# Patient Record
Sex: FEMALE | Race: WHITE | NOT HISPANIC OR LATINO | Employment: FULL TIME | URBAN - METROPOLITAN AREA
[De-identification: names, ages, dates, MRNs, and addresses within clinical notes are randomized per-mention and may not be internally consistent; named-entity substitution may affect disease eponyms.]

---

## 2017-01-03 ENCOUNTER — HOSPITAL ENCOUNTER (OUTPATIENT)
Dept: MAMMOGRAPHY | Facility: CLINIC | Age: 52
Discharge: HOME | End: 2017-01-03
Payer: COMMERCIAL

## 2017-01-03 DIAGNOSIS — C50.911 MALIGNANT NEOPLASM OF RIGHT FEMALE BREAST (HCC): ICD-10-CM

## 2017-01-03 PROCEDURE — G0204 DX MAMMO INCL CAD BI: HCPCS

## 2017-01-17 ENCOUNTER — ALLSCRIPTS OFFICE VISIT (OUTPATIENT)
Dept: OTHER | Facility: OTHER | Age: 52
End: 2017-01-17

## 2017-03-07 ENCOUNTER — GENERIC CONVERSION - ENCOUNTER (OUTPATIENT)
Dept: OTHER | Facility: OTHER | Age: 52
End: 2017-03-07

## 2017-03-07 ENCOUNTER — APPOINTMENT (OUTPATIENT)
Dept: RADIATION ONCOLOGY | Facility: HOSPITAL | Age: 52
End: 2017-03-07
Attending: RADIOLOGY
Payer: COMMERCIAL

## 2017-03-07 PROCEDURE — 99214 OFFICE O/P EST MOD 30 MIN: CPT | Performed by: RADIOLOGY

## 2017-03-13 ENCOUNTER — HOSPITAL ENCOUNTER (EMERGENCY)
Facility: HOSPITAL | Age: 52
Discharge: HOME | End: 2017-03-13
Attending: EMERGENCY MEDICINE
Payer: COMMERCIAL

## 2017-03-13 VITALS
HEART RATE: 69 BPM | OXYGEN SATURATION: 100 % | RESPIRATION RATE: 16 BRPM | BODY MASS INDEX: 30.57 KG/M2 | DIASTOLIC BLOOD PRESSURE: 73 MMHG | SYSTOLIC BLOOD PRESSURE: 132 MMHG | TEMPERATURE: 98.4 F | WEIGHT: 156.53 LBS

## 2017-03-13 DIAGNOSIS — S61.219A LACERATION OF FINGER OF RIGHT HAND, INITIAL ENCOUNTER: Primary | ICD-10-CM

## 2017-03-13 PROCEDURE — 90715 TDAP VACCINE 7 YRS/> IM: CPT | Performed by: PHYSICIAN ASSISTANT

## 2017-03-13 PROCEDURE — 90471 IMMUNIZATION ADMIN: CPT

## 2017-03-13 PROCEDURE — 99282 EMERGENCY DEPT VISIT SF MDM: CPT

## 2017-03-13 RX ORDER — DEXLANSOPRAZOLE 60 MG/1
60 CAPSULE, DELAYED RELEASE ORAL EVERY EVENING
COMMUNITY

## 2017-03-13 RX ORDER — LEVOTHYROXINE SODIUM 0.1 MG/1
88 TABLET ORAL DAILY
COMMUNITY
End: 2021-03-10 | Stop reason: SDUPTHER

## 2017-03-13 RX ADMIN — TETANUS TOXOID, REDUCED DIPHTHERIA TOXOID AND ACELLULAR PERTUSSIS VACCINE, ADSORBED 0.5 ML: 5; 2.5; 8; 8; 2.5 SUSPENSION INTRAMUSCULAR at 20:32

## 2017-04-25 ENCOUNTER — ALLSCRIPTS OFFICE VISIT (OUTPATIENT)
Dept: OTHER | Facility: OTHER | Age: 52
End: 2017-04-25

## 2017-07-11 ENCOUNTER — TRANSCRIBE ORDERS (OUTPATIENT)
Dept: ADMINISTRATIVE | Facility: HOSPITAL | Age: 52
End: 2017-07-11

## 2017-07-11 ENCOUNTER — ALLSCRIPTS OFFICE VISIT (OUTPATIENT)
Dept: OTHER | Facility: OTHER | Age: 52
End: 2017-07-11

## 2017-07-11 DIAGNOSIS — Z85.3 PERSONAL HISTORY OF MALIGNANT NEOPLASM OF BREAST: Primary | ICD-10-CM

## 2017-10-03 ENCOUNTER — GENERIC CONVERSION - ENCOUNTER (OUTPATIENT)
Dept: OTHER | Facility: OTHER | Age: 52
End: 2017-10-03

## 2017-10-03 ENCOUNTER — APPOINTMENT (OUTPATIENT)
Dept: RADIATION ONCOLOGY | Facility: HOSPITAL | Age: 52
End: 2017-10-03
Attending: RADIOLOGY
Payer: COMMERCIAL

## 2017-10-03 PROCEDURE — 99214 OFFICE O/P EST MOD 30 MIN: CPT | Performed by: RADIOLOGY

## 2017-10-24 ENCOUNTER — ALLSCRIPTS OFFICE VISIT (OUTPATIENT)
Dept: OTHER | Facility: OTHER | Age: 52
End: 2017-10-24

## 2017-10-25 NOTE — PROGRESS NOTES
Assessment  1  Breast cancer (174 9) (C50 289)    Plan  Breast cancer    · Follow-up visit in 6 months Evaluation and Treatment  Follow-up  Status: Hold For -  Scheduling  Requested for: [id number]   Ordered; For: Breast cancer; Ordered By: Elmer Peng Performed:  Due: 09MTQ3847    Discussion/Summary  Discussion Summary:   A 46year old postmenopausal woman with stage IIB right breast cancer, grade 3, triple-negative disease  She is negative for BRCA gene mutation  She underwent lumpectomy with axillary lymph node dissection resulting in MILTON  She had 3 positive lymph nodes  She completed adjuvant chemotherapy with dose dense AC , followed by dose dense paclitaxel  She is currently on observation  She has no evidence of recurrent disease, based on her symptomatology and physical examination  I recommended her to continue with surveillance  I will see her again in 6 months for routine follow-up  Regarding vaginal dryness, she is going to see gynecologist  She has triple-negative breast cancer  Therefore, there is no strong contraindication to use estrogen-containing cream, if gynecologist think this is her choice of treatment  Chief Complaint  Chief Complaint: Chief Complaint:   The patient presents to the office today with 1  Right breast cancer, stage IIB (pT2, pN1a, M0)  Grade 3, ER/GA negative, HER-2 negative disease  Diagnosed in December 2015  BRCA gene mutation negative  Chief Complaint Free Text Note Form: Right breast cancer, stage IIB (pT2, pN1a, M0)   Grade 3, ER/GA negative, HER-2 negative disease  Diagnosed in December 2015  History of Present Illness  HPI: A 48year old postmenopausal woman who noticed a lump in the right breast, which she brought to medical attention in early December 2015   She was found to have abnormality, based on imaging study for which she underwent ultrasound-guided biopsy not only from right breast mass, as well as right axillary lymph node, both of which showed invasive ductal carcinoma  She was seen by Dr Ruby Tafoya who did lumpectomy with axillary lymph node dissection in December 31, 2015  She had 2 2x2  2x1 9 cm of invasive ductal carcinoma, grade 3  There was lymphovascular invasion  3/36 axillary lymph nodes positive for metastatic disease  This was ER/WV negative, HER-2 2+ disease  HER-2 fish was negative for gene amplification  She presents today to discuss adjuvant treatment options  She is scheduled to have port placement in February 4, 2016 by Dr Ruby Tafoya  She has no breast related symptomatology  She has no complaint of pain  She denied respiratory symptoms  Her weight is stable  Prior to the surgery, she underwent CT scan of chest and pelvis which showed no evidence of metastatic disease  She had 2 4 mm of pulmonary nodule in the right lower lobe  She was seen by genetic counselor  Her BRCA testing is pending at this time  She is otherwise healthy  She has hypothyroidism as well as hiatal hernia  He only takes 2 medications for each  She does not have a strong family history of breast cancer, at least in her first-degree latitude to however, maternal great aunt had breast cancer  She has 3 older sisters none of whom has history of breast or ovarian cancer  Her performance status is normal    Previous Therapy:   1  Adjuvant chemotherapy with dose dense AC Ã4 followed by dose dense paclitaxel Ã4  Completed in May 2016  Current Therapy: Observation  Disease Status: MILTON status post lumpectomy with axillary lymph node dissection  Interval History: A 46year old postmenopausal woman with stage II B right breast cancer, grade 3, triple-negative disease  She underwent lumpectomy with axillary lymph node dissection, resulting in MILTON  She had 3 positive axillary lymph nodes  She is negative for BRCA gene mutation  She completed adjuvant chemotherapy with dose dense AC , followed by paclitaxel  In May 2016   Subsequently, she had adjuvant radiation therapy with no skin toxicity  She presented today for follow-up  She has no new complaints  Since 6 months ago, she has nearly 30 pound intentional weight loss  She feels well  She denied any pain  She has no complaint of neuropathy  He has no respiratory symptoms, such as cough, sputum production or shortness of breath  Her performance status is normal       Review of Systems  Complete-Female:   Constitutional: No fever, no chills, feels well, no tiredness, no recent weight gain or weight loss  Eyes: No complaints of eye pain, no red eyes, no eyesight problems, no discharge, no dry eyes, no itching of eyes  ENT: no complaints of earache, no loss of hearing, no nose bleeds, no nasal discharge, no sore throat, no hoarseness  Cardiovascular: No complaints of slow heart rate, no fast heart rate, no chest pain, no palpitations, no leg claudication, no lower extremity edema  Respiratory: No complaints of shortness of breath, no wheezing, no cough, no SOB on exertion, no orthopnea, no PND  Gastrointestinal: No complaints of abdominal pain, no constipation, no nausea or vomiting, no diarrhea, no bloody stools  Genitourinary: No complaints of dysuria, no incontinence, no pelvic pain, no dysmenorrhea, no vaginal discharge or bleeding  Musculoskeletal: No complaints of arthralgias, no myalgias, no joint swelling or stiffness, no limb pain or swelling  Integumentary: No complaints of skin rash or lesions, no itching, no skin wounds, no breast pain or lump  Neurological: as noted in HPI  Psychiatric: Not suicidal, no sleep disturbance, no anxiety or depression, no change in personality, no emotional problems  Endocrine: No complaints of proptosis, no hot flashes, no muscle weakness, no deepening of the voice, no feelings of weakness  Hematologic/Lymphatic: No complaints of swollen glands, no swollen glands in the neck, does not bleed easily, does not bruise easily  ROS Reviewed:   ROS reviewed        Active Problems  1  Breast lump (611 72) (N63 0)   2  Encounter for gynecological examination without abnormal finding (V72 31) (Z01 419)   3  Gastroesophageal reflux disease without esophagitis (530 81) (K21 9)   4  Lymphedema (457 1) (I89 0)   5  On antineoplastic chemotherapy (V58 69) (Z79 899)   6  Other specified hypothyroidism (244 8) (E03 8)   7  Port-a-cath in place (V45 89) (B22 438)   8  Shingles (053 9) (B02 9)    Past Medical History  1  History of Breast cancer metastasized to axillary lymph node, right (174 9,196 3)   (C50 911,C77 3)   2  History of Hashimoto thyroiditis (V12 29) (Z86 39)   3  History of malignant neoplasm of breast (V10 3) (Z85 3)   4  History of radiation therapy (V15 3) (Z92 3)   5  History of Hypothyroidism due to Hashimoto's thyroiditis (244 8,245 2) (E03 8,E06 3)   6  History of Malignant neoplasm of upper-outer quadrant of right female breast (174 4)   (C50 411)  Active Problems And Past Medical History Reviewed: The active problems and past medical history were reviewed and updated today  Surgical History  1  History of Cholecystectomy   2  History of Right Breast Lumpectomy   3  History of [place] Lymph Node Biopsy   4  History of Simple Bunion Exostectomy (Silver Procedure)   5  History of Tonsillectomy  Surgical History Reviewed: The surgical history was reviewed and updated today  Family History  Mother    1  Family history of hypertension (V17 49) (Z82 49)   2  Family history of skin cancer (V16 8) (Z80 8)  Father    3  Family history of skin cancer (V16 8) (Z80 8)  Family History Reviewed: The family history was reviewed and updated today  Social History   · Alcohol use (V49 89) (Z78 9)   · Former smoker (A14 12) (X35 046)   · History of Former smoker (V15 82) (V30 205)   · No alcohol use   · No drug use  Social History Reviewed: The social history was reviewed and updated today  The social history was reviewed and is unchanged  Current Meds   1  Dexilant 60 MG Oral Capsule Delayed Release; Therapy: (Recorded:34Vvl4527) to Recorded   2  Dulcolax Stool Softener 100 MG Oral Capsule Recorded   3  Levothyroxine Sodium 88 MCG Oral Tablet; Therapy: (Soo Mckeon) to Recorded   4  Vitamin D3 TABS; TAKE TABLET Daily; Therapy: (Recorded:07Mar2017) to Recorded  Medication List Reviewed: The medication list was reviewed and updated today  Allergies  1  Ibuprofen TABS   2  NSAIDs   3  Tylenol  4  Dairy   5  Dust   6  Mold   7  Pollen   8  Trees   9  Animal dander - Cats    Vitals  Vital Signs    Recorded: 62VOV5880 10:38AM   Temperature 97 F   Heart Rate 71   Respiration 16   Systolic 021   Diastolic 72   Height 5 ft    Weight 131 lb    BMI Calculated 25 58   BSA Calculated 1 56   O2 Saturation 97     Physical Exam    Constitutional   General appearance: No acute distress, well appearing and well nourished  Eyes   Conjunctiva and lids: No swelling, erythema or discharge  Pupils and irises: Equal, round and reactive to light  Ears, Nose, Mouth, and Throat   External inspection of ears and nose: Normal     Otoscopic examination: Tympanic membranes translucent with normal light reflex  Canals patent without erythema  Nasal mucosa, septum, and turbinates: Normal without edema or erythema  Oropharynx: Normal with no erythema, edema, exudate or lesions  Pulmonary   Respiratory effort: No increased work of breathing or signs of respiratory distress  Auscultation of lungs: Clear to auscultation  Cardiovascular   Palpation of heart: Normal PMI, no thrills  Auscultation of heart: Normal rate and rhythm, normal S1 and S2, without murmurs  Examination of extremities for edema and/or varicosities: Normal     Carotid pulses: Normal     Abdomen   Abdomen: Non-tender, no masses  Liver and spleen: No hepatomegaly or splenomegaly  Lymphatic   Palpation of lymph nodes in neck: No lymphadenopathy      Musculoskeletal   Gait and station: Normal     Digits and nails: Normal without clubbing or cyanosis  Inspection/palpation of joints, bones, and muscles: Normal     Skin   Skin and subcutaneous tissue: Normal without rashes or lesions  Neurologic   Cranial nerves: Cranial nerves 2-12 intact  Reflexes: 2+ and symmetric  Sensation: No sensory loss  Psychiatric   Orientation to person, place, and time: Normal     Mood and affect: Normal     Additional Exam:  Breast exam; lumpectomy scar at outer upper quadrant of the right breast with no palpable abnormalities  Left breast exam is negative  ECOG 0       Results/Data  Results Form:   Results   Pathology 2 2x2  2x1 9 cm of invasive ductal carcinoma, grade 3  There is evidence of lymphovascular invasion  3/36, axillary lymph node were positive for metastatic disease  ER/MO negative, HER-2 negative disease  Stage IIB(pT2, pN1a,M0)  Radiology Mammography in January 2017 was benign  BI-RADS 2  Lab Results July 2016, WBC 4 0, hemoglobin 12 8, platelet count 192  Future Appointments    Date/Time Provider Specialty Site   12/15/2017 09:15 AM CHARLA Ibarra   Obstetrics/Gynecology [facility]   01/15/2018 09:45 AM Manolo Mera MD Surgical Oncology [facility]     Signatures   Electronically signed by : CHARLA Salas ; Oct 24 2017 10:57AM EST                       (Author)

## 2017-12-22 ENCOUNTER — GENERIC CONVERSION - ENCOUNTER (OUTPATIENT)
Dept: OTHER | Facility: OTHER | Age: 52
End: 2017-12-22

## 2018-01-09 ENCOUNTER — HOSPITAL ENCOUNTER (OUTPATIENT)
Dept: MAMMOGRAPHY | Facility: CLINIC | Age: 53
Discharge: HOME | End: 2018-01-09
Payer: COMMERCIAL

## 2018-01-09 DIAGNOSIS — Z85.3 PERSONAL HISTORY OF MALIGNANT NEOPLASM OF BREAST: ICD-10-CM

## 2018-01-09 PROCEDURE — 77066 DX MAMMO INCL CAD BI: CPT

## 2018-01-09 PROCEDURE — G0279 TOMOSYNTHESIS, MAMMO: HCPCS

## 2018-01-10 NOTE — PROGRESS NOTES
Discussion/Summary  Discussion Summary:   Status: Genetic testing pending, estimated turnaround time to 3 weeks  Summary:    The patient was seen for genetic counseling to discuss possible genetic testing, relative to her recent diagnosis of triple negative breast cancer  Family information was reviewed and a 3 generation pedigree was drawn  The patient was recently diagnosed with triple-negative breast cancer at the age of 48  Her family history significant for a paternal great aunt was diagnosed with ovarian cancer in her 62s to 76s and a maternal great aunt breast cancer  In addition her brother, mother, and father have all had skin cancer  We reviewed the genetics of cancer, hereditary and familial risks, main benefits and limitations of genetic testing for susceptibility to cancer  We discussed genes associated with hereditary breast cancer including BRCA1 and BRCA2  We reviewed cancer risks associated with these genes, options for medical management, and potential risks for family members  We also discussed genetic testing options, the genetic testing process, and insurance concern, possible test results  Genetic testing: The patient elected to pursue genetic testing for hereditary breast cancer genes  Informed consent was obtained and a DNA sample was collected  The sample was sent to [facility] for integrated BRCA testing the my risk hereditary cancer panel  Test results should be available in approximately 2-3 weeks  The patient elected to have results disclosed by phone and she will be contacted once results are available  Signatures   Electronically signed by :  Mildred Santo, [facility]; Feb 4 2016  1:54PM EST                       (Author)

## 2018-01-11 NOTE — RESULT NOTES
Verified Results  (1) CBC/PLT/DIFF 29Jun2016 07:44AM Zev Mcmahon Order Number: [id number]  TW Order Number: [id number]     Test Name Result Flag Reference   WBC COUNT 3 56 Thousand/uL L 4 31-10 16   RBC COUNT 4 47 Million/uL  3 81-5 12   HEMOGLOBIN 12 5 g/dL  11 5-15 4   HEMATOCRIT 39 8 %  34 8-46  1   MCV 89 fL  82-98   MCH 28 0 pg  26 8-34 3   MCHC 31 4 g/dL  31 4-37 4   RDW 13 8 %  11 6-15 1   MPV 9 8 fL  8 9-12 7   PLATELET COUNT 560 Thousands/uL  149-390   NEUTROPHILS RELATIVE PERCENT 48 %  43-75   LYMPHOCYTES RELATIVE PERCENT 35 %  14-44   MONOCYTES RELATIVE PERCENT 9 %  4-12   EOSINOPHILS RELATIVE PERCENT 7 % H 0-6   BASOPHILS RELATIVE PERCENT 1 %  0-1   NEUTROPHILS ABSOLUTE COUNT 1 71 Thousands/?L L 1 85-7 62   LYMPHOCYTES ABSOLUTE COUNT 1 24 Thousands/?L  0 60-4 47   MONOCYTES ABSOLUTE COUNT 0 32 Thousand/?L  0 17-1 22   EOSINOPHILS ABSOLUTE COUNT 0 24 Thousand/?L  0 00-0 61   BASOPHILS ABSOLUTE COUNT 0 05 Thousands/?L  0 00-0 10

## 2018-01-11 NOTE — MISCELLANEOUS
Message  Return to work or school:   Evy Khoury is under my professional care   She was seen in my office on   She is able to return to work on  1/15/16            Signatures   Electronically signed by : Agatha Solorio, ; Jan 20 2016  1:41PM EST                       (Author)

## 2018-01-13 VITALS
HEART RATE: 68 BPM | HEIGHT: 60 IN | BODY MASS INDEX: 27.68 KG/M2 | WEIGHT: 141 LBS | OXYGEN SATURATION: 99 % | DIASTOLIC BLOOD PRESSURE: 68 MMHG | RESPIRATION RATE: 16 BRPM | TEMPERATURE: 97 F | SYSTOLIC BLOOD PRESSURE: 100 MMHG

## 2018-01-13 VITALS
RESPIRATION RATE: 18 BRPM | DIASTOLIC BLOOD PRESSURE: 74 MMHG | WEIGHT: 147 LBS | TEMPERATURE: 97 F | HEART RATE: 88 BPM | HEIGHT: 60 IN | SYSTOLIC BLOOD PRESSURE: 110 MMHG | OXYGEN SATURATION: 97 % | BODY MASS INDEX: 28.86 KG/M2

## 2018-01-13 VITALS
HEART RATE: 68 BPM | WEIGHT: 163 LBS | DIASTOLIC BLOOD PRESSURE: 78 MMHG | RESPIRATION RATE: 14 BRPM | SYSTOLIC BLOOD PRESSURE: 118 MMHG | HEIGHT: 60 IN | TEMPERATURE: 98.3 F | BODY MASS INDEX: 32 KG/M2

## 2018-01-14 VITALS
BODY MASS INDEX: 25.99 KG/M2 | WEIGHT: 132.38 LBS | HEART RATE: 65 BPM | TEMPERATURE: 98.3 F | RESPIRATION RATE: 16 BRPM | OXYGEN SATURATION: 99 % | HEIGHT: 60 IN | SYSTOLIC BLOOD PRESSURE: 110 MMHG | DIASTOLIC BLOOD PRESSURE: 72 MMHG

## 2018-01-14 VITALS
RESPIRATION RATE: 16 BRPM | WEIGHT: 131 LBS | DIASTOLIC BLOOD PRESSURE: 72 MMHG | TEMPERATURE: 97 F | SYSTOLIC BLOOD PRESSURE: 102 MMHG | BODY MASS INDEX: 25.72 KG/M2 | OXYGEN SATURATION: 97 % | HEIGHT: 60 IN | HEART RATE: 71 BPM

## 2018-01-14 VITALS
HEIGHT: 60 IN | OXYGEN SATURATION: 98 % | TEMPERATURE: 97.5 F | RESPIRATION RATE: 20 BRPM | SYSTOLIC BLOOD PRESSURE: 122 MMHG | HEART RATE: 72 BPM | WEIGHT: 155 LBS | DIASTOLIC BLOOD PRESSURE: 66 MMHG | BODY MASS INDEX: 30.43 KG/M2

## 2018-01-15 ENCOUNTER — GENERIC CONVERSION - ENCOUNTER (OUTPATIENT)
Dept: OTHER | Facility: OTHER | Age: 53
End: 2018-01-15

## 2018-01-15 NOTE — MISCELLANEOUS
Message   Recorded as Task   Date: 03/28/2016 09:52 AM, Created By: Melany Blakely   Task Name: Call Back   Assigned To: Promise Stager   Regarding Patient: Marie Rubio, Status: Active   Comment:    Melany Blakely - 28 Mar 2016 9:52 AM     TASK CREATED  PT called to make Helena Diehl aware that she is currently going through a head cold      (no fevers) and she would like to know if he would like know if he want her to do anything about it? [phone]   Silke Muse - 28 Mar 2016 10:27 AM     TASK REASSIGNED: Previously Assigned To Paty Friedman - 28 Mar 2016 12:55 PM     TASK EDITED  Pt developed a sore throat three days that has progressed into nasal congestion and a cough  Pt denies having any fevers, and sputum is thick, but clear in color  Pt advised to rest, increase fluid intake, use a humidifer at night, and take sudafed for the congestion  Pt was instructed to call her PCP if she started wheezing or developed yellow/green sputum  Pt verbally understood  Active Problems    1  Breast cancer metastasized to axillary lymph node, right (174 9,196 3) (C50 911,C77 3)   2  Breast lump (611 72) (N63)   3  Gastroesophageal reflux disease without esophagitis (530 81) (K21 9)   4  Malignant neoplasm of upper-outer quadrant of right female breast (174 4) (C50 411)   5  On antineoplastic chemotherapy (V58 69) (G09 515)    Current Meds   1  Claritin 10 MG Oral Tablet; take 1 tablet daily as needed Recorded   2  Levothyroxine Sodium 88 MCG Oral Tablet; Therapy: (Van Alexander) to Recorded   3  Lidocaine-Prilocaine 2 5-2 5 % External Cream; APPLY 1 INCH TO IV SITE PRIOR TO   CHEMO TREATMENT; Therapy: 78XRY0551 to (Last Rx:27Jan2016) Ordered   4  Metoclopramide HCl - 10 MG Oral Tablet; one q6hrs prn nausea; Therapy: 22MST9270 to (Last GX:67GWI9029)  Requested for: 95NNU4523 Ordered   5   NexIUM 40 MG Oral Capsule Delayed Release (Esomeprazole Magnesium); TAKE 1   CAPSULE DAILY AS NEEDED; Therapy: (Recorded:81Pgl1509) to Recorded    Allergies    1  Ibuprofen TABS    2  Dairy   3  Dust   4  Mold   5  Pollen   6  Trees    Signatures   Electronically signed by :  Annamarie Francis RN; Mar 28 2016 12:55PM EST                       (Author)

## 2018-01-15 NOTE — MISCELLANEOUS
Message  survivorship care plan discussed with patient over the phone, copy mailed to pt  Active Problems    1  Breast cancer metastasized to axillary lymph node, right (174 9,196 3) (C50 911,C77 3)   2  Breast lump (611 72) (N63)   3  Gastroesophageal reflux disease without esophagitis (530 81) (K21 9)   4  Lymphedema (457 1) (I89 0)   5  Malignant neoplasm of upper-outer quadrant of right female breast (174 4) (C50 411)   6  On antineoplastic chemotherapy (V58 69) (Z79 899)   7  Port-a-cath in place (V45 89) (Q69 854)   8  Shingles (053 9) (B02 9)    Current Meds   1  Levothyroxine Sodium 88 MCG Oral Tablet; Therapy: (Recorded:02Tsq8055) to Recorded   2  NexIUM 40 MG Oral Capsule Delayed Release (Esomeprazole Magnesium); TAKE 1   CAPSULE DAILY AS NEEDED; Therapy: (Recorded:95Iqa4187) to Recorded    Allergies    1  Ibuprofen TABS   2  NSAIDs   3  Tylenol    4  Dairy   5  Dust   6  Mold   7  Pollen   8  Trees   9   Animal dander - Cats    Signatures   Electronically signed by : Cody Melo, ; Dec  5 2016  3:30PM EST                       (Author)

## 2018-01-15 NOTE — MISCELLANEOUS
Message  Called patient on Friday 2/5 post op  Patient doing well  She was given paperwork w/ an incorrect diagnosis when d/c from hospital (wrong dx code)  I assure patient that we would make sure it is corrected  Active Problems    1  Breast cancer metastasized to axillary lymph node, right (174 9,196 3) (C50 911,C77 3)   2  Breast lump (611 72) (N63)   3  Gastroesophageal reflux disease without esophagitis (530 81) (K21 9)   4  Malignant neoplasm of upper-outer quadrant of right female breast (174 4) (C50 411)   5  On antineoplastic chemotherapy (V58 11) (Z51 11)    Current Meds   1  Levothyroxine Sodium 88 MCG Oral Tablet; Therapy: (Recorded:89Prx5057) to Recorded   2  Lidocaine-Prilocaine 2 5-2 5 % External Cream; APPLY 1 INCH TO IV SITE PRIOR TO   CHEMO TREATMENT; Therapy: 36KJU5380 to (Last Rx:27Jan2016) Ordered   3  Metoclopramide HCl - 10 MG Oral Tablet; one q6hrs prn nausea; Therapy: 89OCX1380 to (Last JZ:13SID1811)  Requested for: 87TIW6173 Ordered   4  NexIUM 40 MG Oral Capsule Delayed Release (Esomeprazole Magnesium); TAKE 1   CAPSULE DAILY AS NEEDED; Therapy: (Recorded:38Llu3353) to Recorded    Allergies    1  Ibuprofen TABS    2  Dairy   3  Dust   4  Mold   5  Pollen   6   Trees    Signatures   Electronically signed by : Sandra Quiroz, ; Feb 8 2016  2:02PM EST                       (Author)

## 2018-01-16 NOTE — MISCELLANEOUS
Message  Return to work or school:   Brock Valdivia is under my professional care  She was seen in my office on 5/3/16       From a Medical Oncology stand point the patient is cleared to have a massage          Signatures   Electronically signed by : Keyla Osman, ; May  4 2016  2:21PM EST                       (Author)

## 2018-01-16 NOTE — MISCELLANEOUS
Message   Recorded as Task   Date: 02/11/2016 10:07 AM, Created By: Orville Cortez   Task Name: Call Back   Assigned To: Litzy Mas   Regarding Patient: Henri Lomas, Status: Active   CommentDoren Carrier - 11 Feb 2016 10:07 AM     TASK CREATED  pt had treatment on tuesday  Hives on hands- took benadryl yesteday  now the hives have spread and they are up both of her arms  They are not too itchy, they are more circles formed like spots, red, not warm to the touch  [phone]   Litzy Mas - 11 Feb 2016 11:33 AM     TASK EDITED  spoke to patient - she states that the hives are getting better with the benadryl but it makes her "sleepy"  we discussed the use of zyrtec and benadryl cream during the day and she can take the oral benadryl at night  Pateint states that this would be better and verbalized understanding and agreement  see conversation      Active Problems    1  Breast cancer metastasized to axillary lymph node, right (174 9,196 3) (C50 911,C77 3)   2  Breast lump (611 72) (N63)   3  Gastroesophageal reflux disease without esophagitis (530 81) (K21 9)   4  Malignant neoplasm of upper-outer quadrant of right female breast (174 4) (C50 411)   5  On antineoplastic chemotherapy (V58 11) (Z51 11)    Current Meds   1  Levothyroxine Sodium 88 MCG Oral Tablet; Therapy: (Recorded:79Vdk0554) to Recorded   2  Lidocaine-Prilocaine 2 5-2 5 % External Cream; APPLY 1 INCH TO IV SITE PRIOR TO   CHEMO TREATMENT; Therapy: 19BMK4692 to (Last Rx:27Jan2016) Ordered   3  Metoclopramide HCl - 10 MG Oral Tablet; one q6hrs prn nausea; Therapy: 39RAV7654 to (Last KO:86FPJ3198)  Requested for: 65CWI8861 Ordered   4  NexIUM 40 MG Oral Capsule Delayed Release (Esomeprazole Magnesium); TAKE 1   CAPSULE DAILY AS NEEDED; Therapy: (Recorded:98Ysx5198) to Recorded    Allergies    1  Ibuprofen TABS    2  Dairy   3  Dust   4  Mold   5  Pollen   6   Trees    Signatures   Electronically signed by : Savanna Cruz, ; Feb 11 2016 11:35AM EST                       (Author)

## 2018-01-16 NOTE — PROGRESS NOTES
Discussion/Summary  Discussion Summary:   Genetic Testing Results    Genetic testing results are NEGATIVE for mutations and large rearrangements in:  BRCA1, BRCA2, APC, WOLF, BARD1, BMPR1A, BRIP1, CDH1, CDKN2A, CHEK2, EPCAM, MLH1, MSH2, MSH6, MUTYH, NBN, PMS2, PTEN, RAD51C, RAD51D, SMAD4, STK11, and TP53  Discussion  Genetic testing results were disclosed to the patient  She does not carry a mutation in BRCA1, BRCA2, or any of the additional hereditary cancer genes tested  We discussed that while reassuring, these results do not explain the history of cancer in the family, and it is possible that an unidentifiable mutation or a mutation in another gene(s) is leading to an increased risk of cancer in the family  Therefore it is important that she and her family members maintain regular cancer screening as directed by their physicians  The patient's questions were answered and she was encouraged to call with any future questions or concerns  Signatures   Electronically signed by : Liana Sweeney, [facility]; Feb 4 2016  1:56PM EST                       (Author)    Electronically signed by :  Liana Sweeney, [facility]; Feb 4 2016  1:57PM EST                       (Author)

## 2018-01-24 VITALS
SYSTOLIC BLOOD PRESSURE: 110 MMHG | RESPIRATION RATE: 16 BRPM | DIASTOLIC BLOOD PRESSURE: 70 MMHG | OXYGEN SATURATION: 99 % | HEART RATE: 59 BPM | HEIGHT: 61 IN | TEMPERATURE: 98.1 F | WEIGHT: 130 LBS | BODY MASS INDEX: 24.55 KG/M2

## 2018-01-24 VITALS
HEIGHT: 61 IN | HEART RATE: 59 BPM | BODY MASS INDEX: 23.98 KG/M2 | DIASTOLIC BLOOD PRESSURE: 60 MMHG | SYSTOLIC BLOOD PRESSURE: 104 MMHG | WEIGHT: 127 LBS

## 2018-03-09 ENCOUNTER — RADIATION ONCOLOGY FOLLOW-UP (OUTPATIENT)
Dept: RADIATION ONCOLOGY | Facility: HOSPITAL | Age: 53
End: 2018-03-09

## 2018-03-09 ENCOUNTER — APPOINTMENT (OUTPATIENT)
Dept: RADIATION ONCOLOGY | Facility: HOSPITAL | Age: 53
End: 2018-03-09
Attending: RADIOLOGY
Payer: COMMERCIAL

## 2018-03-09 VITALS
TEMPERATURE: 98 F | WEIGHT: 131.8 LBS | DIASTOLIC BLOOD PRESSURE: 60 MMHG | BODY MASS INDEX: 25.87 KG/M2 | RESPIRATION RATE: 16 BRPM | HEART RATE: 61 BPM | SYSTOLIC BLOOD PRESSURE: 90 MMHG | HEIGHT: 60 IN

## 2018-03-09 DIAGNOSIS — C50.411 MALIGNANT NEOPLASM OF UPPER-OUTER QUADRANT OF RIGHT BREAST IN FEMALE, ESTROGEN RECEPTOR NEGATIVE (HCC): Primary | ICD-10-CM

## 2018-03-09 DIAGNOSIS — Z17.1 MALIGNANT NEOPLASM OF UPPER-OUTER QUADRANT OF RIGHT BREAST IN FEMALE, ESTROGEN RECEPTOR NEGATIVE (HCC): Primary | ICD-10-CM

## 2018-03-09 PROCEDURE — 99215 OFFICE O/P EST HI 40 MIN: CPT | Performed by: RADIOLOGY

## 2018-03-09 RX ORDER — SIMVASTATIN 20 MG
20 TABLET ORAL
COMMUNITY
Start: 2018-02-09

## 2018-03-09 RX ORDER — DOCUSATE SODIUM 100 MG/1
100 CAPSULE, LIQUID FILLED ORAL DAILY
COMMUNITY

## 2018-03-09 RX ORDER — MELATONIN
DAILY
COMMUNITY

## 2018-03-09 NOTE — PROGRESS NOTES
Selam Varma  1965   Ms Varma is a 46 y o  female       Chief Complaint   Patient presents with    Follow-up     Breast Cancer       No matching staging information was found for the patient  Oncology History    Last seen 10/3/17    10/24/17 Dr Fabiola Hallman and returns 424/18  continue with surveillance    1/9/18 Mammogram  IMPRESSION: No suspicious findings  ACR BI-RADS® Assessments: BiRad:2 - Benign    1/15/18 Follow up with Dr Maritza Cedeno and returns 7/25/18  Continue observation        Breast cancer ([facility] [address] )    12/22/2015 Initial Diagnosis     Breast cancer ([facility] [address] )         12/22/2015 Biopsy     Right breast biopsy  Invasive breast cancer, Grade 3/3  ER/MT negative  HER 2 negative    Right axillary lymph node biopsy  Metastatic carcinoma similar to the mammary carcinoma          12/31/2015 Surgery     Right lumpectomy with axillary dissection  Invasive breast carcinoma, Grade 3  2/36 nodes positive; extranodal extension seen          Cancer Staged     [id number], Stage IIB, invasive ductal carcinoma of the right breast, Grade III, triple negative         1/21/2016 Genetic Testing     Negative         2/9/2016 - 3/22/2016 Chemotherapy     Adriamycin/Cytoxan q 2 weeks x4         4/5/2016 - 5/17/2016 Chemotherapy     Taxol q 2 weeks x 4         6/15/2016 - 7/28/2016 Radiation     right breast and supraclavicular region to a total dose of 5000 cGy in 25 daily fractions  An additional 1000 cGy boost in 5 daily fractions was delivered to the lumpectomy cavity            Clinical Trial: no      Interval History  Last seen 10/3/17    10/24/17 Dr Fabiola Hallman and returns 424/18  continue with surveillance    1/9/18 Mammogram  IMPRESSION: No suspicious findings  ACR BI-RADS® Assessments: BiRad:2 - Benign    1/15/18 Follow up with Dr Maritza Cedeno and returns 7/25/18  Continue observation      OB/GYN History:  The patient underwent menarche at 15 years  Menopause StatusPost  No LMP recorded   Patient is postmenopausal   Menopause at 49} years  Menopause Reason Natural  Hormone replacement therapy: yes  Years used Just started  Roderick 2   Para 2   Age at first delivery being 21 years  Nursing: yes  Birth control pills: yes  Years used <1 year  There are no preventive care reminders to display for this patient  Patient Active Problem List   Diagnosis    Breast cancer [facility])     Past Medical History:   Diagnosis Date    Cancer ([facility] [address] )     GERD (gastroesophageal reflux disease)     History of radiation therapy     Hypothyroidism      Past Surgical History:   Procedure Laterality Date    AXILLARY NODE DISSECTION Right     BREAST LUMPECTOMY Right     BUNIONECTOMY      CHOLECYSTECTOMY      DE INSJ TUNNELED CTR VAD W/SUBQ PORT AGE 5 YR/> Left 2/4/2016    Procedure: INSERTION VENOUS PORT (PORT-A-CATH);   Surgeon: Dora Hernandez MD;  [facility];  Service: Surgical Oncology    TONSILLECTOMY       Family History   Problem Relation Age of Onset    Skin cancer Mother     Skin cancer Father     Skin cancer Brother     Stroke Maternal Grandmother      Social History     Social History    Marital status: /Civil Union     Spouse name: N/A    Number of children: 2    Years of education: N/A     Occupational History    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      Social History Main Topics    Smoking status: Former Smoker     Types: Cigarettes     Quit date: 2012    Smokeless tobacco: Never Used      Comment: 1 ppd x 10 years and quit in 1991  then 1 1/2 years and quit in 2012    Alcohol use Yes      Comment: occasional    Drug use: No    Sexual activity: Not on file     Other Topics Concern    Not on file     Social History Narrative    No narrative on file       Current Outpatient Prescriptions:     cetirizine (ZyrTEC) 10 mg tablet, Take 10 mg by mouth daily as needed for allergies Indications: pt had reaction to Neulasta with hives resulting that resolved after Deborah hughes, Per Dr Adwoa Miramontes she will be on Zyrtec with Neulasta , Disp: , Rfl:     cholecalciferol (VITAMIN D3) 1,000 units tablet, Take by mouth daily, Disp: , Rfl:     dexlansoprazole (DEXILANT) 60 MG capsule, Take by mouth, Disp: , Rfl:     docusate sodium (DULCOLAX) 100 mg capsule, Take by mouth, Disp: , Rfl:     estradiol (ESTRING) 2 MG vaginal ring, Insert into the vagina every 3 (three) months, Disp: , Rfl:     levothyroxine 88 mcg tablet, Take by mouth, Disp: , Rfl:     loratadine (CLARITIN) 10 mg tablet, Take 10 mg by mouth daily  , Disp: , Rfl:     simvastatin (ZOCOR) 20 mg tablet, , Disp: , Rfl:   Allergies   Allergen Reactions    Dairy Aid [Lactase]     Dust Mite Extract     Ibuprofen Hives    Mold Extract [Trichophyton]     Pollen Extract     Aleve [Naproxen Sodium] Rash    Neulasta [Pegfilgrastim] Hives     Dr Ivette Cadena is aware and will continue Neulasta with the pt taking Zyrtec and having Benadryl PRN for hives  This occurred with her first dose of Neulasta     Tylenol [Acetaminophen] Rash       Review of Systems:  Review of Systems   Constitutional: Negative  HENT: Positive for tinnitus  Eyes: Positive for photophobia  Respiratory: Negative  Cardiovascular: Negative  Gastrointestinal: Negative  Endocrine: Negative  Genitourinary: Negative  Musculoskeletal: Positive for arthralgias (Hands), neck pain and neck stiffness  Skin: Negative  Allergic/Immunologic: Negative  Neurological: Positive for numbness (Right axilla down arm )  Hematological: Negative  Psychiatric/Behavioral: Negative  Vitals:    03/09/18 1455   BP: 90/60   BP Location: Left arm   Patient Position: Sitting   Cuff Size: Standard   Pulse: 61   Resp: 16   Temp: 98 °F (36 7 °C)   TempSrc: Oral   Weight: 59 8 kg (131 lb 12 8 oz)   Height: 5' (1 524 m)       Imaging:No results found

## 2018-03-09 NOTE — PROGRESS NOTES
[address] 1965 46 y o  female [id number]      History of Present Illness   No matching staging information was found for the patient  HPI: Yasemin Timmons presents today for routine follow-up overall feeling well  She is recovering from an upper respiratory infection but otherwise is without complaints  She denies any significant discomfort in the treated breast   Her energy and appetite have been within normal limits  She recently underwent surveillance mammography in January and this was benign  She continues to follow with Medical and Surgical Oncology  Historical Information   Previous Oncology History:      Breast cancer ([facility] )    12/22/2015 Initial Diagnosis     Breast cancer ([place] [address] )         12/22/2015 Biopsy     Right breast biopsy  Invasive breast cancer, Grade 3/3  ER/MT negative  HER 2 negative    Right axillary lymph node biopsy  Metastatic carcinoma similar to the mammary carcinoma          12/31/2015 Surgery     Right lumpectomy with axillary dissection  Invasive breast carcinoma, Grade 3  2/36 nodes positive; extranodal extension seen          Cancer Staged     [id number], Stage IIB, invasive ductal carcinoma of the right breast, Grade III, triple negative         1/21/2016 Genetic Testing     Negative         2/9/2016 - 3/22/2016 Chemotherapy     Adriamycin/Cytoxan q 2 weeks x4         4/5/2016 - 5/17/2016 Chemotherapy     Taxol q 2 weeks x 4         6/15/2016 - 7/28/2016 Radiation     right breast and supraclavicular region to a total dose of 5000 cGy in 25 daily fractions  An additional 1000 cGy boost in 5 daily fractions was delivered to the lumpectomy cavity              Past Medical History:   Diagnosis Date    Cancer ([facility] [address] )     GERD (gastroesophageal reflux disease)     History of radiation therapy     Hypothyroidism      Past Surgical History:   Procedure Laterality Date    AXILLARY NODE DISSECTION Right     BREAST LUMPECTOMY Right     BUNIONECTOMY      CHOLECYSTECTOMY      [place] INSJ TUNNELED CTR VAD W/SUBQ PORT AGE 5 YR/> Left 2/4/2016    Procedure: INSERTION VENOUS PORT (PORT-A-CATH); Surgeon: Siddharth Burnette MD;  [facility];  Service: Surgical Oncology    TONSILLECTOMY         Social History   History   Alcohol Use    Yes     Comment: occasional     History   Drug Use No     History   Smoking Status    Former Smoker    Types: Cigarettes    Quit date: 2012   Smokeless Tobacco    Never Used     Comment: 1 ppd x 10 years and quit in [address]  then 1 1/2 years and quit in 2012         Meds/Allergies     Current Outpatient Prescriptions:     cetirizine (ZyrTEC) 10 mg tablet, Take 10 mg by mouth daily as needed for allergies Indications: pt had reaction to Neulasta with hives resulting that resolved after [place] hughes, Per Dr Regulo Arteaga she will be on Zyrtec with Neulasta , Disp: , Rfl:     cholecalciferol (VITAMIN D3) 1,000 units tablet, Take by mouth daily, Disp: , Rfl:     dexlansoprazole (DEXILANT) 60 MG capsule, Take by mouth, Disp: , Rfl:     docusate sodium (DULCOLAX) 100 mg capsule, Take by mouth, Disp: , Rfl:     estradiol (ESTRING) 2 MG vaginal ring, Insert into the vagina every 3 (three) months, Disp: , Rfl:     levothyroxine 88 mcg tablet, Take by mouth, Disp: , Rfl:     loratadine (CLARITIN) 10 mg tablet, Take 10 mg by mouth daily  , Disp: , Rfl:     simvastatin (ZOCOR) 20 mg tablet, , Disp: , Rfl:   Allergies   Allergen Reactions    Dairy Aid [Lactase]     Dust Mite Extract     Ibuprofen Hives    Mold Extract [Trichophyton]     Pollen Extract     Aleve [Naproxen Sodium] Rash    Neulasta [Pegfilgrastim] Hives     Dr Regulo Arteaga is aware and will continue Neulasta with the pt taking Zyrtec and having Benadryl PRN for hives  This occurred with her first dose of Neulasta     Tylenol [Acetaminophen] Rash         Review of Systems  Review of Systems   Constitutional: Negative  HENT: Positive for tinnitus  Eyes: Positive for photophobia  Respiratory: Negative  Cardiovascular: Negative  Gastrointestinal: Negative  Endocrine: Negative  Genitourinary: Negative  Musculoskeletal: Positive for arthralgias (Hands), neck pain and neck stiffness  Skin: Negative  Allergic/Immunologic: Negative  Neurological: Positive for numbness (Right axilla down arm )  Hematological: Negative  Psychiatric/Behavioral: Negative  Objective   BP 90/60 (BP Location: Left arm, Patient Position: Sitting, Cuff Size: Standard)   Pulse 61   Temp 98 °F (36 7 °C) (Oral)   Resp 16   Ht 5' (1 524 m)   Wt 59 8 kg (131 lb 12 8 oz)   BMI 25 74 kg/m²    0  Karnofsky: 90 - Able to carry on normal activity; minor signs or symptoms of disease      Physical Exam  The patient presents today no apparent distress  Sclera anicteric  No cervical supraclavicular or axillary lymphadenopathy  Normal S1-S2 regular rate and rhythm  Lungs clear to auscultation bilaterally  Right breast is somewhat smaller than the left but is otherwise soft without visible or palpable abnormalities or significant tenderness  The left breast is within normal limits  No swelling of the upper extremities  Normal range of motion of the shoulders bilaterally  Normal speech  Normal affect  No results found for this or any previous visit (from the past 672 hour(s))  No results found  Assessment/Plan      Aimee Fontenotjamierahel has done well in follow-up and has no clinical evidence of disease at this time  She will continue to follow closely both Medical and Surgical Oncology and will return to our department in 1 year for routine follow-up

## 2018-03-20 ENCOUNTER — OFFICE VISIT (OUTPATIENT)
Dept: OBGYN CLINIC | Facility: CLINIC | Age: 53
End: 2018-03-20
Payer: COMMERCIAL

## 2018-03-20 VITALS
RESPIRATION RATE: 16 BRPM | HEART RATE: 65 BPM | SYSTOLIC BLOOD PRESSURE: 108 MMHG | BODY MASS INDEX: 26.58 KG/M2 | DIASTOLIC BLOOD PRESSURE: 60 MMHG | HEIGHT: 60 IN | WEIGHT: 135.4 LBS

## 2018-03-20 DIAGNOSIS — N95.2 VAGINAL ATROPHY: Primary | ICD-10-CM

## 2018-03-20 PROBLEM — E03.9 HYPOTHYROIDISM: Status: ACTIVE | Noted: 2017-12-22

## 2018-03-20 PROCEDURE — 99213 OFFICE O/P EST LOW 20 MIN: CPT | Performed by: OBSTETRICS & GYNECOLOGY

## 2018-03-20 RX ORDER — LEVOTHYROXINE SODIUM 88 UG/1
TABLET ORAL
COMMUNITY
Start: 2018-03-16 | End: 2018-08-22

## 2018-03-20 NOTE — PROGRESS NOTES
Assessment/Plan:    Vaginal atrophy  Currently using Esring - initially had excellent response - increased discharge, decreased discomfort with intercourse  Pt's  also felt less dryness  Towards the end of the 3 months, response is not quite as dramatic, but still improved from baseline  Will continue use of Esring and will re-eval in 1 year    Subjective:      Patient ID: Shaina Holcomb is a 46 y o  female P2 with vaginal atrophy causing dyspareunia      HPI  Since starting use of the Estring she has decreased dryness and pain  The improvements were most dramatic within the first month  She denies vaginal bleeding  She denies difficulty placing the ring, and it has not come out  No difficulties with bowel or bladder function  She has had a head cold for the past 2 weeks but is starting to feel better  The following portions of the patient's history were reviewed and updated as appropriate: allergies, current medications, past family history, past medical history, past social history, past surgical history and problem list     Review of Systems   Constitutional: Negative  HENT: Negative  Respiratory: Negative  Cardiovascular: Negative  Gastrointestinal: Negative  Genitourinary: Negative  Neurological: Negative  Objective:      /60   Pulse 65   Resp 16   Ht 5' (1 524 m)   Wt 61 4 kg (135 lb 6 4 oz)   BMI 26 44 kg/m²          Physical Exam   Constitutional: She is oriented to person, place, and time  She appears well-developed and well-nourished  HENT:   Head: Normocephalic and atraumatic  Neurological: She is alert and oriented to person, place, and time  Psychiatric: She has a normal mood and affect   Her behavior is normal

## 2018-03-20 NOTE — ASSESSMENT & PLAN NOTE
Currently using Esring - initially had excellent response - increased discharge, decreased discomfort with intercourse    Pt's  also felt less dryness  Towards the end of the 3 months, response is not quite as dramatic, but still improved from baseline  Will continue use of Esring and will re-eval in 1 year

## 2018-05-15 ENCOUNTER — OFFICE VISIT (OUTPATIENT)
Dept: HEMATOLOGY ONCOLOGY | Facility: CLINIC | Age: 53
End: 2018-05-15
Payer: COMMERCIAL

## 2018-05-15 VITALS
HEART RATE: 81 BPM | DIASTOLIC BLOOD PRESSURE: 68 MMHG | OXYGEN SATURATION: 98 % | TEMPERATURE: 97.7 F | BODY MASS INDEX: 26.41 KG/M2 | WEIGHT: 134.5 LBS | HEIGHT: 60 IN | SYSTOLIC BLOOD PRESSURE: 104 MMHG | RESPIRATION RATE: 18 BRPM

## 2018-05-15 DIAGNOSIS — Z17.1 MALIGNANT NEOPLASM OF UPPER-OUTER QUADRANT OF RIGHT BREAST IN FEMALE, ESTROGEN RECEPTOR NEGATIVE (HCC): Primary | ICD-10-CM

## 2018-05-15 DIAGNOSIS — C50.411 MALIGNANT NEOPLASM OF UPPER-OUTER QUADRANT OF RIGHT BREAST IN FEMALE, ESTROGEN RECEPTOR NEGATIVE (HCC): Primary | ICD-10-CM

## 2018-05-15 PROCEDURE — 99214 OFFICE O/P EST MOD 30 MIN: CPT | Performed by: INTERNAL MEDICINE

## 2018-05-15 NOTE — PROGRESS NOTES
Hematology / Oncology Outpatient Follow Up Note    Jeramie Vila 46 y o  female :1965 [id number]         Date:  5/15/2018    Assessment / Plan:  A 46year old postmenopausal woman with stage IIB right breast cancer, grade 3, triple-negative disease  She is negative for BRCA gene mutation  She underwent lumpectomy with axillary lymph node dissection resulting in MILTON  She had 3 positive lymph nodes  She completed adjuvant chemotherapy with dose dense AC , followed by dose dense paclitaxel  She is currently on observation  Clinically, she has no evidence recurrent disease, based on her symptomatology and physical examination  I recommended her to continue with surveillance  I will see her again in 6 months for routine follow-up  She is in agreement with my recommendations  Subjective:     HPI:  A 48year old postmenopausal woman who noticed a lump in the right breast, which she brought to medical attention in early 2015  She was found to have abnormality, based on imaging study for which she underwent ultrasound-guided biopsy not only from right breast mass, as well as right axillary lymph node, both of which showed invasive ductal carcinoma  She was seen by Dr Charles Matta who did lumpectomy with axillary lymph node dissection in 2015  She had 2 2x2  2x1 9 cm of invasive ductal carcinoma, grade 3  There was lymphovascular invasion  3/36 axillary lymph nodes positive for metastatic disease  This was ER/MA negative, HER-2 2+ disease  HER-2 fish was negative for gene amplification  She presents today to discuss adjuvant treatment options  She is scheduled to have port placement in 2016 by Dr Charles Matta  She has no breast related symptomatology  She has no complaint of pain  She denied respiratory symptoms  Her weight is stable  Prior to the surgery, she underwent CT scan of chest and pelvis which showed no evidence of metastatic disease   She had 2 4 mm of pulmonary nodule in the right lower lobe  She was seen by genetic counselor  Her BRCA testing is pending at this time  She is otherwise healthy  She has hypothyroidism as well as hiatal hernia  He only takes 2 medications for each  She does not have a strong family history of breast cancer, at least in her first-degree latitude to however, maternal great aunt had breast cancer  She has 3 older sisters none of whom has history of breast or ovarian cancer  Her performance status is normal          Interval History:  A 46year old postmenopausal woman with stage II B right breast cancer, grade 3, triple-negative disease  She underwent lumpectomy with axillary lymph node dissection, resulting in MILTON  She had 3 positive axillary lymph nodes  She is negative for BRCA gene mutation  She completed adjuvant chemotherapy with dose dense AC , followed by paclitaxel  In May 2016  Subsequently, she had adjuvant radiation therapy with no skin toxicity  She presented today for follow-up  She feels very well  She denied any bone pain  Her weight is stable  She has no respiratory symptoms  She underwent mammography in January 2018 which was benign  She is up to date with colonoscopy screening which was done in November 2017  Her performance status is normal       Objective:     Primary Diagnosis:    1  Right breast cancer, stage IIB (pT2, pN1a, M0)  Grade 3, ER/DE negative, HER-2 negative disease  Diagnosed in December 2015   2  BRCA gene mutation negative  Cancer Staging:  Cancer Staging  No matching staging information was found for the patient  Previous Hematologic/ Oncologic Treatment:     1  Adjuvant chemotherapy with dose dense AC x4 followed by dose dense paclitaxel x4  Completed in May 2016  Current Hematologic/ Oncologic Treatment:      Observation  Disease Status:     MILTON status post lumpectomy with axillary lymph node dissection  Test Results:    Pathology:    2 2x2  2x1 9 cm of invasive ductal carcinoma, grade 3  There is evidence of lymphovascular invasion  3/36, axillary lymph node were positive for metastatic disease  ER/WV negative, HER-2 negative disease  Stage IIB(pT2, pN1a,M0)  Radiology:    Mammography in January 2018 was benign  BI-RADS 2  Laboratory:        Physical Exam:      General Appearance:    Alert, oriented        Eyes:    PERRL   Ears:    Normal external ear canals, both ears   Nose:   Nares normal, septum midline   Throat:   Mucosa moist  Pharynx without injection  Neck:   Supple       Lungs:     Clear to auscultation bilaterally   Chest Wall:    No tenderness or deformity    Heart:    Regular rate and rhythm       Abdomen:     Soft, non-tender, bowel sounds +, no organomegaly           Extremities:   Extremities no cyanosis or edema       Skin:   no rash or icterus  Lymph nodes:   Cervical, supraclavicular, and axillary nodes normal   Neurologic:   CNII-XII intact, normal strength, sensation and reflexes     Throughout          Breast exam:   lumpectomy scar at outer upper quadrant of the right breast with no palpable abnormalities  Left breast exam is negative  ROS: Review of Systems   All other systems reviewed and are negative  Imaging: No results found  Labs:   Lab Results   Component Value Date    WBC 4 08 (L) 07/27/2016    HGB 12 8 07/27/2016    HCT 40 5 07/27/2016    MCV 87 07/27/2016     07/27/2016     Lab Results   Component Value Date     05/16/2016    K 4 1 05/16/2016     05/16/2016    CO2 27 05/16/2016    ANIONGAP 8 05/16/2016    BUN 16 05/16/2016    CREATININE 0 80 05/16/2016    GLUCOSE 106 05/16/2016    CALCIUM 9 1 05/16/2016    AST 22 05/16/2016    ALT 28 05/16/2016    ALKPHOS 160 (H) 05/16/2016    PROT 7 3 05/16/2016    BILITOT 0 20 05/16/2016    EGFR >60 0 05/16/2016         Current Medications: Reviewed  Allergies: Reviewed  PMH/FH/SH:  Reviewed      Vital Sign:    Body surface area is 1 58 meters squared      Wt Readings from Last 3 Encounters:   05/15/18 61 kg (134 lb 8 oz)   03/20/18 61 4 kg (135 lb 6 4 oz)   03/09/18 59 8 kg (131 lb 12 8 oz)        Temp Readings from Last 3 Encounters:   05/15/18 97 7 °F (36 5 °C)   03/09/18 98 °F (36 7 °C) (Oral)   01/15/18 98 1 °F (36 7 °C)        BP Readings from Last 3 Encounters:   05/15/18 104/68   03/20/18 108/60   03/09/18 90/60         Pulse Readings from Last 3 Encounters:   05/15/18 81   03/20/18 65   03/09/18 61     @LASTSAO2(3)@

## 2018-08-22 ENCOUNTER — OFFICE VISIT (OUTPATIENT)
Dept: SURGICAL ONCOLOGY | Facility: CLINIC | Age: 53
End: 2018-08-22
Payer: COMMERCIAL

## 2018-08-22 VITALS
SYSTOLIC BLOOD PRESSURE: 110 MMHG | RESPIRATION RATE: 16 BRPM | HEIGHT: 60 IN | HEART RATE: 64 BPM | WEIGHT: 138 LBS | TEMPERATURE: 98.2 F | BODY MASS INDEX: 27.09 KG/M2 | DIASTOLIC BLOOD PRESSURE: 72 MMHG

## 2018-08-22 DIAGNOSIS — C50.911 BREAST CANCER, STAGE 2, RIGHT (HCC): Primary | ICD-10-CM

## 2018-08-22 PROCEDURE — 99212 OFFICE O/P EST SF 10 MIN: CPT | Performed by: SURGERY

## 2018-08-22 NOTE — PROGRESS NOTES
Surgical Oncology Follow Up       [address]  [facility] [address] [place] [address]  1965  [id number]  [address]  CANCER [facility] SURGICAL ONCOLOGY [place] [id number]    Chief Complaint   Patient presents with    Follow-up     Patient is here for a 6 month follow up    Breast Cancer       Assessment/Plan:    No problem-specific Assessment & Plan notes found for this encounter  Diagnoses and all orders for this visit:    Breast cancer, stage 2, right ([email or web address] 75 )  -     Mammo diagnostic bilateral w 3d & cad; Future    Other orders  -     TURMERIC PO; Take 1 tablet by mouth daily        Advance Care Planning/Advance Directives:  Discussed disease status, cancer treatment plans and/or cancer treatment goals with the patient  Breast cancer ([place] [address] )    12/22/2015 Initial Diagnosis     Breast cancer ([place] [address] )         12/22/2015 Biopsy     Right breast biopsy  Invasive breast cancer, Grade 3/3  ER/CO negative  HER 2 negative    Right axillary lymph node biopsy  Metastatic carcinoma similar to the mammary carcinoma          12/31/2015 Surgery     Right lumpectomy with axillary dissection  Invasive breast carcinoma, Grade 3  2/36 nodes positive; extranodal extension seen          Cancer Staged     [id number], Stage IIB, invasive ductal carcinoma of the right breast, Grade III, triple negative         1/21/2016 Genetic Testing     Negative         2/9/2016 - 3/22/2016 Chemotherapy     Adriamycin/Cytoxan q 2 weeks x4         4/5/2016 - 5/17/2016 Chemotherapy     Taxol q 2 weeks x 4         6/15/2016 - 7/28/2016 Radiation     right breast and supraclavicular region to a total dose of 5000 cGy in 25 daily fractions  An additional 1000 cGy boost in 5 daily fractions was delivered to the lumpectomy cavity            History of Present Illness:  Diagnosis and Staging: Right breast cancer, stage IIB (pT2, pN1a, M0)  Grade 3, ER/OK negative, HER-2 negative disease  Diagnosed in December 2015  Treatment History: Dec 2015: status-post lumpectomy and axillary dissection with 2 of 36 nodes positive  Interval History: Mrs Tex Washington Is a 77-year-old woman who is 2 5 years post right breast lumpectomy and axillary node dissection  She completed chemotherapy and radiation afterwards  She is doing well, without complaints      Review of Systems:  Review of Systems   Constitutional: Negative  HENT: Negative  Eyes: Negative  Respiratory: Negative  Cardiovascular: Negative  Gastrointestinal: Negative  Endocrine: Negative  Genitourinary: Negative  Musculoskeletal: Negative  Skin: Negative  Allergic/Immunologic: Negative  Neurological: Negative  Hematological: Negative  Psychiatric/Behavioral: Negative  Patient Active Problem List   Diagnosis    Breast cancer ([facility] 75 )    Gastroesophageal reflux disease without esophagitis    Hypothyroidism    Lymphedema    Vaginal atrophy     Past Medical History:   Diagnosis Date    Cancer ([facility] )     GERD (gastroesophageal reflux disease)     History of radiation therapy     Hypothyroidism      Past Surgical History:   Procedure Laterality Date    AXILLARY NODE DISSECTION Right     BREAST LUMPECTOMY Right     BUNIONECTOMY      CHOLECYSTECTOMY      OK INSJ TUNNELED CTR VAD W/SUBQ PORT AGE 5 YR/> Left 2/4/2016    Procedure: INSERTION VENOUS PORT (PORT-A-CATH);   Surgeon: Dutch Hyatt MD;  [facility];  Service: Surgical Oncology    TONSILLECTOMY       Family History   Problem Relation Age of Onset    Skin cancer Mother     Skin cancer Father     Skin cancer Brother     Stroke Maternal Grandmother      Social History     Social History    Marital status: /Civil Union     Spouse name: N/A    Number of children: 2    Years of education: N/A     Occupational History    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    Smoking status: Former Smoker     Types: Cigarettes     Quit date: 2012    Smokeless tobacco: Never Used      Comment: 1 ppd x 10 years and quit in 1991  then 1 1/2 years and quit in 2012    Alcohol use Yes      Comment: occasional    Drug use: No    Sexual activity: Not on file     Other Topics Concern    Not on file     Social History Narrative    No narrative on file       Current Outpatient Prescriptions:     cholecalciferol (VITAMIN D3) 1,000 units tablet, Take by mouth daily, Disp: , Rfl:     dexlansoprazole (DEXILANT) 60 MG capsule, Take by mouth, Disp: , Rfl:     docusate sodium (DULCOLAX) 100 mg capsule, Take by mouth, Disp: , Rfl:     estradiol (ESTRING) 2 MG vaginal ring, Insert into the vagina every 3 (three) months, Disp: , Rfl:     levothyroxine 88 mcg tablet, Take by mouth, Disp: , Rfl:     loratadine (CLARITIN) 10 mg tablet, Take 10 mg by mouth as needed for allergies  , Disp: , Rfl:     simvastatin (ZOCOR) 20 mg tablet, , Disp: , Rfl:     TURMERIC PO, Take 1 tablet by mouth daily, Disp: , Rfl:   Allergies   Allergen Reactions    Dairy Aid [Lactase]     Dust Mite Extract     Ibuprofen Hives    Mold Extract [Trichophyton]     Pollen Extract     Aleve [Naproxen Sodium] Rash    Neulasta [Pegfilgrastim] Hives     Dr Coty Maharaj is aware and will continue Neulasta with the pt taking Zyrtec and having Benadryl PRN for hives  This occurred with her first dose of Neulasta     Tylenol [Acetaminophen] Rash     Vitals:    08/22/18 1142   BP: 110/72   Pulse: 64   Resp: 16   Temp: 98 2 °F (36 8 °C)       Physical Exam   Constitutional: She is oriented to person, place, and time  She appears well-developed and well-nourished  HENT:   Head: Normocephalic and atraumatic  Right Ear: External ear normal    Left Ear: External ear normal    Eyes: Conjunctivae are normal  Pupils are equal, round, and reactive to light  Neck: Normal range of motion  Cardiovascular: Normal rate, regular rhythm, normal heart sounds and intact distal pulses  Pulmonary/Chest: Effort normal and breath sounds normal    Abdominal: Soft  Bowel sounds are normal    Musculoskeletal: Normal range of motion  Neurological: She is alert and oriented to person, place, and time  Skin: Skin is warm and dry  Breasts: breasts appear normal, no suspicious masses, no skin or nipple changes or axillary nodes  Psychiatric: She has a normal mood and affect  Her behavior is normal  Judgment and thought content normal          Results:  Labs:  CBC, Coags, BMP, Mg, Phos     Imaging  No results found  I reviewed the above laboratory and imaging data  Discussion/Summary:  History of stage II breast cancer 2 week and half years post lumpectomy radiation  She is doing well and has no evidence of disease recurrence  Plan on 6 month follow-up  She is due for mammogram in January of 2019  Will order for her and see her afterwards

## 2018-08-22 NOTE — LETTER
August 22, 2018     Joice Goldmann  [address] [place] [address]    Patient: Erica Gamboa   YOB: 1965   Date of Visit: 8/22/2018       Dear Dr Kavin Lemons:    Thank you for referring Salinapiter Madrid to me for evaluation  Below are my notes for this consultation  If you have questions, please do not hesitate to call me  I look forward to following your patient along with you  Sincerely,        Surjit Correa MD        CC: Sharyne Shears, MD Gillermo Golas, MD Eulas Romberg, MD Oswaldo Lent, MD  8/22/2018 12:08 PM  Sign at close encounter     Surgical Oncology Follow Up       [address] [place] [address] [place] [address]  1965  [id number]  8850 [facility]  [facility] [place] [id number]    Chief Complaint   Patient presents with    Follow-up     Patient is here for a 6 month follow up    Breast Cancer       Assessment/Plan:    No problem-specific Assessment & Plan notes found for this encounter  Diagnoses and all orders for this visit:    Breast cancer, stage 2, right ([facility] )  -     Mammo diagnostic bilateral w 3d & cad; Future    Other orders  -     TURMERIC PO; Take 1 tablet by mouth daily        Advance Care Planning/Advance Directives:  Discussed disease status, cancer treatment plans and/or cancer treatment goals with the patient  Breast cancer ([facility] )    12/22/2015 Initial Diagnosis     Breast cancer ([facility] )         12/22/2015 Biopsy     Right breast biopsy  Invasive breast cancer, Grade 3/3  ER/NV negative    HER 2 negative    Right axillary lymph node biopsy  Metastatic carcinoma similar to the mammary carcinoma          12/31/2015 Surgery     Right lumpectomy with axillary dissection  Invasive breast carcinoma, Grade 3  2/36 nodes positive; extranodal extension seen          Cancer Staged     [id number], Stage IIB, invasive ductal carcinoma of the right breast, Grade III, triple negative         1/21/2016 Genetic Testing     Negative         2/9/2016 - 3/22/2016 Chemotherapy     Adriamycin/Cytoxan q 2 weeks x4         4/5/2016 - 5/17/2016 Chemotherapy     Taxol q 2 weeks x 4         6/15/2016 - 7/28/2016 Radiation     right breast and supraclavicular region to a total dose of 5000 cGy in 25 daily fractions  An additional 1000 cGy boost in 5 daily fractions was delivered to the lumpectomy cavity            History of Present Illness:  Diagnosis and Staging: Right breast cancer, stage IIB (pT2, pN1a, M0)  Grade 3, ER/NV negative, HER-2 negative disease  Diagnosed in December 2015  Treatment History: Dec 2015: status-post lumpectomy and axillary dissection with 2 of 36 nodes positive  Interval History: Mrs Noemi Mendez Is a 55-year-old woman who is 2 5 years post right breast lumpectomy and axillary node dissection  She completed chemotherapy and radiation afterwards  She is doing well, without complaints      Review of Systems:  Review of Systems   Constitutional: Negative  HENT: Negative  Eyes: Negative  Respiratory: Negative  Cardiovascular: Negative  Gastrointestinal: Negative  Endocrine: Negative  Genitourinary: Negative  Musculoskeletal: Negative  Skin: Negative  Allergic/Immunologic: Negative  Neurological: Negative  Hematological: Negative  Psychiatric/Behavioral: Negative          Patient Active Problem List   Diagnosis    Breast cancer ([facility] )    Gastroesophageal reflux disease without esophagitis    Hypothyroidism    Lymphedema    Vaginal atrophy     Past Medical History:   Diagnosis Date    Cancer ([facility] )     GERD (gastroesophageal reflux disease)     History of radiation therapy     Hypothyroidism      Past Surgical History:   Procedure Laterality Date    AXILLARY NODE DISSECTION Right     BREAST LUMPECTOMY Right     BUNIONECTOMY  CHOLECYSTECTOMY      NM INSJ TUNNELED CTR VAD W/SUBQ PORT AGE 5 YR/> Left 2/4/2016    Procedure: INSERTION VENOUS PORT (PORT-A-CATH);   Surgeon: Gali Mann MD;  [facility];  Service: Surgical Oncology    TONSILLECTOMY       Family History   Problem Relation Age of Onset    Skin cancer Mother     Skin cancer Father     Skin cancer Brother     Stroke Maternal Grandmother      Social History     Social History    Marital status: /Civil Union     Spouse name: N/A    Number of children: 2    Years of education: N/A     Occupational History    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      Social History Main Topics    Smoking status: Former Smoker     Types: Cigarettes     Quit date: 2012    Smokeless tobacco: Never Used      Comment: 1 ppd x 10 years and quit in 1991  then 1 1/2 years and quit in 2012    Alcohol use Yes      Comment: occasional    Drug use: No    Sexual activity: Not on file     Other Topics Concern    Not on file     Social History Narrative    No narrative on file       Current Outpatient Prescriptions:     cholecalciferol (VITAMIN D3) 1,000 units tablet, Take by mouth daily, Disp: , Rfl:     dexlansoprazole (DEXILANT) 60 MG capsule, Take by mouth, Disp: , Rfl:     docusate sodium (DULCOLAX) 100 mg capsule, Take by mouth, Disp: , Rfl:     estradiol (ESTRING) 2 MG vaginal ring, Insert into the vagina every 3 (three) months, Disp: , Rfl:     levothyroxine 88 mcg tablet, Take by mouth, Disp: , Rfl:     loratadine (CLARITIN) 10 mg tablet, Take 10 mg by mouth as needed for allergies  , Disp: , Rfl:     simvastatin (ZOCOR) 20 mg tablet, , Disp: , Rfl:     TURMERIC PO, Take 1 tablet by mouth daily, Disp: , Rfl:   Allergies   Allergen Reactions    Dairy Aid [Lactase]     Dust Mite Extract     Ibuprofen Hives    Mold Extract [Trichophyton]     Pollen Extract     Aleve [Naproxen Sodium] Rash    Neulasta [Pegfilgrastim] Hives     Dr Mohan Mccormack is aware and will continue Neulasta with the pt taking Zyrtec and having Benadryl PRN for hives  This occurred with her first dose of Neulasta     Tylenol [Acetaminophen] Rash     Vitals:    08/22/18 1142   BP: 110/72   Pulse: 64   Resp: 16   Temp: 98 2 °F (36 8 °C)       Physical Exam   Constitutional: She is oriented to person, place, and time  She appears well-developed and well-nourished  HENT:   Head: Normocephalic and atraumatic  Right Ear: External ear normal    Left Ear: External ear normal    Eyes: Conjunctivae are normal  Pupils are equal, round, and reactive to light  Neck: Normal range of motion  Cardiovascular: Normal rate, regular rhythm, normal heart sounds and intact distal pulses  Pulmonary/Chest: Effort normal and breath sounds normal    Abdominal: Soft  Bowel sounds are normal    Musculoskeletal: Normal range of motion  Neurological: She is alert and oriented to person, place, and time  Skin: Skin is warm and dry  Breasts: breasts appear normal, no suspicious masses, no skin or nipple changes or axillary nodes  Psychiatric: She has a normal mood and affect  Her behavior is normal  Judgment and thought content normal          Results:  Labs:  CBC, Coags, BMP, Mg, Phos     Imaging  No results found  I reviewed the above laboratory and imaging data  Discussion/Summary:  History of stage II breast cancer 2 week and half years post lumpectomy radiation  She is doing well and has no evidence of disease recurrence  Plan on 6 month follow-up  She is due for mammogram in January of 2019  Will order for her and see her afterwards

## 2018-11-20 ENCOUNTER — OFFICE VISIT (OUTPATIENT)
Dept: HEMATOLOGY ONCOLOGY | Facility: CLINIC | Age: 53
End: 2018-11-20
Payer: COMMERCIAL

## 2018-11-20 VITALS
DIASTOLIC BLOOD PRESSURE: 74 MMHG | WEIGHT: 140 LBS | BODY MASS INDEX: 27.48 KG/M2 | RESPIRATION RATE: 16 BRPM | HEIGHT: 60 IN | OXYGEN SATURATION: 98 % | SYSTOLIC BLOOD PRESSURE: 114 MMHG | TEMPERATURE: 96.6 F | HEART RATE: 72 BPM

## 2018-11-20 DIAGNOSIS — Z17.1 MALIGNANT NEOPLASM OF UPPER-OUTER QUADRANT OF RIGHT BREAST IN FEMALE, ESTROGEN RECEPTOR NEGATIVE (HCC): Primary | ICD-10-CM

## 2018-11-20 DIAGNOSIS — C50.411 MALIGNANT NEOPLASM OF UPPER-OUTER QUADRANT OF RIGHT BREAST IN FEMALE, ESTROGEN RECEPTOR NEGATIVE (HCC): Primary | ICD-10-CM

## 2018-11-20 PROCEDURE — 99214 OFFICE O/P EST MOD 30 MIN: CPT | Performed by: INTERNAL MEDICINE

## 2018-11-20 NOTE — PROGRESS NOTES
Hematology / Oncology Outpatient Follow Up Note    Abad Churchill 48 y o  female :1965 [facility]:[id number]         Date:  2018    Assessment / Plan:  A 48year old postmenopausal woman with stage IIB right breast cancer, grade 3, triple-negative disease  She is negative for BRCA gene mutation  She underwent lumpectomy with axillary lymph node dissection resulting in MILTON  She had 3 positive lymph nodes  She completed adjuvant chemotherapy with dose dense AC , followed by dose dense paclitaxel  She is currently on observation  Based on her symptomatology and physical examinations, she has no evidence recurrent disease  I recommended her to continue with surveillance  She is in agreement with my recommendation  I will see her again in a year for routine follow-up                    Subjective:      HPI:  A 48year old postmenopausal woman who noticed a lump in the right breast, which she brought to medical attention in early 2015  She was found to have abnormality, based on imaging study for which she underwent ultrasound-guided biopsy not only from right breast mass, as well as right axillary lymph node, both of which showed invasive ductal carcinoma  She was seen by Dr Minor Nguyen who did lumpectomy with axillary lymph node dissection in 2015  She had 2 2x2  2x1 9 cm of invasive ductal carcinoma, grade 3  There was lymphovascular invasion  3/36 axillary lymph nodes positive for metastatic disease  This was ER/TN negative, HER-2 2+ disease  HER-2 fish was negative for gene amplification  She presents today to discuss adjuvant treatment options  She is scheduled to have port placement in 2016 by Dr Minor Nguyen  She has no breast related symptomatology  She has no complaint of pain  She denied respiratory symptoms  Her weight is stable  Prior to the surgery, she underwent CT scan of chest and pelvis which showed no evidence of metastatic disease   She had 2 4 mm of pulmonary nodule in the right lower lobe  She was seen by genetic counselor  Her BRCA testing is pending at this time  She is otherwise healthy  She has hypothyroidism as well as hiatal hernia  He only takes 2 medications for each  She does not have a strong family history of breast cancer, at least in her first-degree latitude to however, maternal great aunt had breast cancer  She has 3 older sisters none of whom has history of breast or ovarian cancer  Her performance status is normal             Interval History:  A 48year old postmenopausal woman with stage II B right breast cancer, grade 3, triple-negative disease  She underwent lumpectomy with axillary lymph node dissection, resulting in MILTON  She had 3 positive axillary lymph nodes  She is negative for BRCA gene mutation  She completed adjuvant chemotherapy with dose dense AC , followed by paclitaxel  In May 2016  Subsequently, she had adjuvant radiation therapy with no skin toxicity  She presented today for follow-up  She feels somewhat anxious  However, physically, she is doing very well  Her weight is stable  She has no complaint of pain  She has occasional mild hot flashes  She denied any respiratory symptoms  Her performance status is normal                            Objective:      Primary Diagnosis:     1  Right breast cancer, stage IIB (pT2, pN1a, M0)  Grade 3, ER/CT negative, HER-2 negative disease  Diagnosed in December 2015   2  BRCA gene mutation negative       Cancer Staging:  Cancer Staging  No matching staging information was found for the patient         Previous Hematologic/ Oncologic Treatment:      1  Adjuvant chemotherapy with dose dense AC x4 followed by dose dense paclitaxel x4  Completed in May 2016      Current Hematologic/ Oncologic Treatment:       Observation      Disease Status:      MILTON status post lumpectomy with axillary lymph node dissection       Test Results:     Pathology:     2 2x2  2x1 9 cm of invasive ductal carcinoma, grade 3  There is evidence of lymphovascular invasion  3/36, axillary lymph node were positive for metastatic disease  ER/[place] negative, HER-2 negative disease  Stage IIB(pT2, pN1a,M0)     Radiology:     Mammography in January 2018 was benign  BI-RADS 2      Laboratory:      CMP in April 2018 were all within normal limits      Physical Exam:        General Appearance:    Alert, oriented          Eyes:    PERRL   Ears:    Normal external ear canals, both ears   Nose:   Nares normal, septum midline   Throat:   Mucosa moist  Pharynx without injection  Neck:   Supple         Lungs:     Clear to auscultation bilaterally   Chest Wall:    No tenderness or deformity    Heart:    Regular rate and rhythm         Abdomen:     Soft, non-tender, bowel sounds +, no organomegaly               Extremities:   Extremities no cyanosis or edema         Skin:   no rash or icterus  Lymph nodes:   Cervical, supraclavicular, and axillary nodes normal   Neurologic:   CNII-XII intact, normal strength, sensation and reflexes     Throughout             Breast exam:   lumpectomy scar at outer upper quadrant of the right breast with no palpable abnormalities  Left breast exam is negative  ROS: Review of Systems   Constitutional:        Mild hot flashes   All other systems reviewed and are negative  Imaging: No results found        Labs:   Lab Results   Component Value Date    WBC 4 08 (L) 07/27/2016    HGB 12 8 07/27/2016    HCT 40 5 07/27/2016    MCV 87 07/27/2016     07/27/2016     Lab Results   Component Value Date     12/28/2015    K 4 1 05/16/2016     05/16/2016    CO2 27 05/16/2016    ANIONGAP 10 12/28/2015    BUN 16 05/16/2016    CREATININE 0 80 05/16/2016    GLUCOSE 100 12/28/2015    CALCIUM 9 1 05/16/2016    AST 22 05/16/2016    ALT 28 05/16/2016    ALKPHOS 160 (H) 05/16/2016    EGFR >60 0 05/16/2016           Current Medications: Reviewed  Allergies: Reviewed  PMH/FH/SH:  Reviewed      Vital Sign:    Body surface area is 1 6 meters squared      Wt Readings from Last 3 Encounters:   11/20/18 63 5 kg (140 lb)   08/22/18 62 6 kg (138 lb)   05/15/18 61 kg (134 lb 8 oz)        Temp Readings from Last 3 Encounters:   11/20/18 (!) 96 6 °F (35 9 °C) (Tympanic)   08/22/18 98 2 °F (36 8 °C)   05/15/18 97 7 °F (36 5 °C)        BP Readings from Last 3 Encounters:   11/20/18 114/74   08/22/18 110/72   05/15/18 104/68         Pulse Readings from Last 3 Encounters:   11/20/18 72   08/22/18 64   05/15/18 81     @LASTSAO2(3)@

## 2018-12-10 DIAGNOSIS — N95.2 VAGINAL ATROPHY: Primary | ICD-10-CM

## 2018-12-10 RX ORDER — ESTRADIOL 2 MG/1
RING VAGINAL
Qty: 1 EACH | Refills: 3 | Status: SHIPPED | OUTPATIENT
Start: 2018-12-10 | End: 2019-02-27 | Stop reason: HOSPADM

## 2019-01-15 ENCOUNTER — HOSPITAL ENCOUNTER (OUTPATIENT)
Dept: MAMMOGRAPHY | Facility: CLINIC | Age: 54
Discharge: HOME | End: 2019-01-15
Payer: COMMERCIAL

## 2019-01-15 VITALS — BODY MASS INDEX: 27.48 KG/M2 | WEIGHT: 140 LBS | HEIGHT: 60 IN

## 2019-01-15 DIAGNOSIS — C50.911 BREAST CANCER, STAGE 2, RIGHT (HCC): ICD-10-CM

## 2019-01-15 PROCEDURE — 77066 DX MAMMO INCL CAD BI: CPT

## 2019-01-15 PROCEDURE — G0279 TOMOSYNTHESIS, MAMMO: HCPCS

## 2019-02-26 PROBLEM — Z08 ENCOUNTER FOR FOLLOW-UP EXAMINATION AFTER COMPLETED TREATMENT FOR MALIGNANT NEOPLASM: Status: ACTIVE | Noted: 2019-02-26

## 2019-02-27 ENCOUNTER — OFFICE VISIT (OUTPATIENT)
Dept: SURGICAL ONCOLOGY | Facility: CLINIC | Age: 54
End: 2019-02-27
Payer: COMMERCIAL

## 2019-02-27 VITALS
BODY MASS INDEX: 27.68 KG/M2 | WEIGHT: 141 LBS | SYSTOLIC BLOOD PRESSURE: 120 MMHG | DIASTOLIC BLOOD PRESSURE: 78 MMHG | RESPIRATION RATE: 16 BRPM | HEART RATE: 74 BPM | TEMPERATURE: 98.4 F | HEIGHT: 60 IN

## 2019-02-27 DIAGNOSIS — Z85.3 HISTORY OF BREAST CANCER: ICD-10-CM

## 2019-02-27 DIAGNOSIS — Z08 ENCOUNTER FOR FOLLOW-UP EXAMINATION AFTER COMPLETED TREATMENT FOR MALIGNANT NEOPLASM: Primary | ICD-10-CM

## 2019-02-27 PROCEDURE — 99213 OFFICE O/P EST LOW 20 MIN: CPT | Performed by: SURGERY

## 2019-02-27 NOTE — LETTER
February 27, 2019     Janneth Iraheta  [address] [place] [address] [employer]  [address] [place] [address]    Patient: Ariel Grey   YOB: 1965   Date of Visit: 2/27/2019       Dear Dr Glover Ards:    Thank you for referring Jayce Yeager to me for evaluation  Below are my notes for this consultation  If you have questions, please do not hesitate to call me  I look forward to following your patient along with you  Sincerely,        Siddharth Burnette MD        CC: MD Raciel Lopez MD Hoy Po, MD Alvira Kettering, MD  2/27/2019 11:02 AM  Sign at close encounter     Surgical Oncology Follow Up       [address] [place] [address] [address]  1965  [id number]  [address] [facility],[address]  [facility] [place] [phone]    Chief Complaint   Patient presents with    Follow-up     Patient is here for a 6 month breast follow up with mammogram done 1/15/2019  Assessment/Plan:    No problem-specific Assessment & Plan notes found for this encounter  Diagnoses and all orders for this visit:    Encounter for follow-up examination after completed treatment for malignant neoplasm    History of breast cancer    Other orders  -     Cyanocobalamin (VITAMIN B 12 PO); Take 1 drop by mouth daily        Advance Care Planning/Advance Directives:  Discussed disease status, cancer treatment plans and/or cancer treatment goals with the patient  History of breast cancer    12/22/2015 Initial Diagnosis     Breast cancer ([address] )         12/22/2015 Biopsy     Right breast biopsy  Invasive breast cancer, Grade 3/3  ER/[place] negative    HER 2 negative    Right axillary lymph node biopsy  Metastatic carcinoma similar to the mammary carcinoma          12/31/2015 Surgery     Right lumpectomy with axillary dissection  Invasive breast carcinoma, Grade 3  2/36 nodes positive; extranodal extension seen          Cancer Staged     [id number], Stage IIB, invasive ductal carcinoma of the right breast, Grade III, triple negative         1/21/2016 Genetic Testing     Negative         2/9/2016 - 3/22/2016 Chemotherapy     Adriamycin/Cytoxan q 2 weeks x4         4/5/2016 - 5/17/2016 Chemotherapy     Taxol q 2 weeks x 4         6/15/2016 - 7/28/2016 Radiation     right breast and supraclavicular region to a total dose of 5000 cGy in 25 daily fractions  An additional 1000 cGy boost in 5 daily fractions was delivered to the lumpectomy cavity            History of Present Illness: Stage II right breast cancer, triple negative, status post lumpectomy, radiation, chemotherapy   -Interval History:  Patient doing well and has no complaints since her last visit  She had a mammogram done in anticipation of today's visit  Review of Systems:  Review of Systems   Constitutional: Negative  HENT: Negative  Eyes: Negative  Respiratory: Negative  Cardiovascular: Negative  Gastrointestinal: Negative  Endocrine: Negative  Genitourinary: Negative  Musculoskeletal: Negative  Skin: Negative  Allergic/Immunologic: Negative  Neurological: Negative  Hematological: Negative  Psychiatric/Behavioral: Negative          Patient Active Problem List   Diagnosis    History of breast cancer    Gastroesophageal reflux disease without esophagitis    Hypothyroidism    Lymphedema    Vaginal atrophy    Encounter for follow-up examination after completed treatment for malignant neoplasm     Past Medical History:   Diagnosis Date    Cancer ([address] )     GERD (gastroesophageal reflux disease)     History of chemotherapy 2016    8 treatments    History of radiation therapy     Hypothyroidism      Past Surgical History:   Procedure Laterality Date    AXILLARY NODE DISSECTION Right     BREAST LUMPECTOMY Right     BUNIONECTOMY      CHOLECYSTECTOMY      MO INSJ TUNNELED CTR VAD W/SUBQ PORT AGE 5 YR/> Left 2016    Procedure: INSERTION VENOUS PORT (PORT-A-CATH);   Surgeon: Damion Rich MD;  [facility];  Service: Surgical Oncology    TONSILLECTOMY       Family History   Problem Relation Age of Onset    Skin cancer Mother     Skin cancer Father     Skin cancer Brother     Stroke Maternal Grandmother      Social History     Socioeconomic History    Marital status: /Civil Union     Spouse name: Not on file    Number of children: 2    Years of education: Not on file    Highest education level: Not on file   Occupational History    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   Social Needs    Financial resource strain: Not on file    Food insecurity:     Worry: Not on file     Inability: Not on file    Transportation needs:     Medical: Not on file     Non-medical: Not on file   Tobacco Use    Smoking status: Former Smoker     Types: Cigarettes     Last attempt to quit:      Years since quittin 1    Smokeless tobacco: Never Used    Tobacco comment: 1 ppd x 10 years and quit in   then 1 1/2 years and quit in    Substance and Sexual Activity    Alcohol use: Yes     Comment: occasional    Drug use: No    Sexual activity: Not on file   Lifestyle    Physical activity:     Days per week: Not on file     Minutes per session: Not on file    Stress: Not on file   Relationships    Social connections:     Talks on phone: Not on file     Gets together: Not on file     Attends [employer] service: Not on file     Active member of club or organization: Not on file     Attends meetings of clubs or organizations: Not on file     Relationship status: Not on file    Intimate partner violence:     Fear of current or ex partner: Not on file     Emotionally abused: Not on file     Physically abused: Not on file     Forced sexual activity: Not on file   Other Topics Concern    Not on file   Social History Narrative    Not on file       Current Outpatient Medications:     cholecalciferol (VITAMIN D3) 1,000 units tablet, Take by mouth daily, Disp: , Rfl:     Cyanocobalamin (VITAMIN B 12 PO), Take 1 drop by mouth daily, Disp: , Rfl:     dexlansoprazole (DEXILANT) 60 MG capsule, Take by mouth, Disp: , Rfl:     docusate sodium (DULCOLAX) 100 mg capsule, Take by mouth, Disp: , Rfl:     levothyroxine 88 mcg tablet, Take by mouth, Disp: , Rfl:     loratadine (CLARITIN) 10 mg tablet, Take 10 mg by mouth as needed for allergies  , Disp: , Rfl:     simvastatin (ZOCOR) 20 mg tablet, , Disp: , Rfl:     TURMERIC PO, Take 1 tablet by mouth daily, Disp: , Rfl:   Allergies   Allergen Reactions    Chocolate     Corn Oil     Dairy Aid [Lactase]     Dust Mite Extract     Ibuprofen Hives    Mold Extract [Trichophyton]     Pollen Extract     Aleve [Naproxen Sodium] Rash    Neulasta [Pegfilgrastim] Hives     Dr Dorina Tucker is aware and will continue Neulasta with the pt taking Zyrtec and having Benadryl PRN for hives  This occurred with her first dose of Neulasta     Tylenol [Acetaminophen] Rash     Vitals:    02/27/19 1015   BP: 120/78   Pulse: 74   Resp: 16   Temp: 98 4 °F (36 9 °C)       Physical Exam   Constitutional: She is oriented to person, place, and time  She appears well-developed and well-nourished  HENT:   Head: Normocephalic and atraumatic  Right Ear: External ear normal    Left Ear: External ear normal    Eyes: Pupils are equal, round, and reactive to light  EOM are normal    Neck: Normal range of motion  Neck supple  Cardiovascular: Normal rate, regular rhythm, normal heart sounds and intact distal pulses  Pulmonary/Chest: Effort normal and breath sounds normal    Abdominal: Soft  Bowel sounds are normal    Musculoskeletal: Normal range of motion  Neurological: She is alert and oriented to person, place, and time  Skin: Skin is warm and dry  Right breast Incision/scar well healed  No evidence of recurrence visible or palpable  Right axilla clinically negative  Left breast axilla normal to inspection and palpation  Psychiatric: She has a normal mood and affect  Her behavior is normal  Judgment and thought content normal          Results:  Labs:  none    Imaging    DIAGNOSIS: Breast cancer, stage 2, right ([address] )     RELEVANT HISTORY:   Family Breast Cancer History: No known family history of breast cancer  Family Medical history: No relevant family history has been documented for this patient  Personal History: No relevant hormone history has been documented for this patient  Surgical history includes lumpectomy  Medical history includes history of radiation therapy and history of chemotherapy      COMPARISONS:  Multiple prior exams dated between 11/05/2010 and 01/09/2018      INDICATION: Roman Longoria is a 48 y o  female presenting for cancer surveillance      TECHNOLOSGIST: Sarah Ngo     VIEWS:  2D views: CC, XCCL views of left, right breast(s) were taken  2D synth views: MLO views of right, left breast(s) were taken  3D views: MLO views of right, left breast(s) were taken      TECHNIQUE:  The current study was evaluated with Computer Aided Detection      TISSUE DENSITY:   There are scattered areas of fibroglandular density         RISK ASSESSMENT:   5 Year Tyrer-Cuzick: 1 01 %  10 Year Tyrer-Cuzick: 2 19 %  Lifetime Tyrer-Cuzick: 8 15 %     FINDINGS:   Bilateral  There are no suspicious masses, grouped microcalcifications or areas of architectural distortion  The skin and nipple areolar complex are unremarkable    Biopsy marker clip is noted in the right breast   Post treatment changes are noted in the right breast   Benign-appearing calcifications are noted in each breast   Intramammary lymph nodes in the upper outer left breast demonstrate long-term stability      IMPRESSION:   No evidence of malignancy            ASSESSMENT/BI-RADS CATEGORY:  Left: 2 - Benign  Right: 2 - Benign  Overall: 2 - Benign     RECOMMENDATION:       - Routine Screening Mammogram in 1 year for both breasts      Workstation ID: [id number]  No results found  I reviewed the above laboratory and imaging data  Discussion/Summary:  History of stage II right breast cancer, now clinically without evidence of recurrence  Plan on follow-up in 6 months for surveillance per guidelines

## 2019-02-27 NOTE — PROGRESS NOTES
Surgical Oncology Follow Up       [address]  [facility] [address] [place] [address] [place] [address] [address]  1965  [id number]  [address]  [facility] [id number]    Chief Complaint   Patient presents with    Follow-up     Patient is here for a 6 month breast follow up with mammogram done 1/15/2019  Assessment/Plan:    No problem-specific Assessment & Plan notes found for this encounter  Diagnoses and all orders for this visit:    Encounter for follow-up examination after completed treatment for malignant neoplasm    History of breast cancer    Other orders  -     Cyanocobalamin (VITAMIN B 12 PO); Take 1 drop by mouth daily        Advance Care Planning/Advance Directives:  Discussed disease status, cancer treatment plans and/or cancer treatment goals with the patient  History of breast cancer    12/22/2015 Initial Diagnosis     Breast cancer ([facility] [address] )         12/22/2015 Biopsy     Right breast biopsy  Invasive breast cancer, Grade 3/3  ER/[place] negative  HER 2 negative    Right axillary lymph node biopsy  Metastatic carcinoma similar to the mammary carcinoma          12/31/2015 Surgery     Right lumpectomy with axillary dissection  Invasive breast carcinoma, Grade 3  2/36 nodes positive; extranodal extension seen          Cancer Staged     [id number], Stage IIB, invasive ductal carcinoma of the right breast, Grade III, triple negative         1/21/2016 Genetic Testing     Negative         2/9/2016 - 3/22/2016 Chemotherapy     Adriamycin/Cytoxan q 2 weeks x4         4/5/2016 - 5/17/2016 Chemotherapy     Taxol q 2 weeks x 4         6/15/2016 - 7/28/2016 Radiation     right breast and supraclavicular region to a total dose of 5000 cGy in 25 daily fractions  An additional 1000 cGy boost in 5 daily fractions was delivered to the lumpectomy cavity            History of Present Illness: Stage II right breast cancer, triple negative, status post lumpectomy, radiation, chemotherapy   -Interval History:  Patient doing well and has no complaints since her last visit  She had a mammogram done in anticipation of today's visit  Review of Systems:  Review of Systems   Constitutional: Negative  HENT: Negative  Eyes: Negative  Respiratory: Negative  Cardiovascular: Negative  Gastrointestinal: Negative  Endocrine: Negative  Genitourinary: Negative  Musculoskeletal: Negative  Skin: Negative  Allergic/Immunologic: Negative  Neurological: Negative  Hematological: Negative  Psychiatric/Behavioral: Negative  Patient Active Problem List   Diagnosis    History of breast cancer    Gastroesophageal reflux disease without esophagitis    Hypothyroidism    Lymphedema    Vaginal atrophy    Encounter for follow-up examination after completed treatment for malignant neoplasm     Past Medical History:   Diagnosis Date    Cancer [address] [address]     GERD (gastroesophageal reflux disease)     History of chemotherapy 2016    8 treatments    History of radiation therapy     Hypothyroidism      Past Surgical History:   Procedure Laterality Date    AXILLARY NODE DISSECTION Right     BREAST LUMPECTOMY Right     BUNIONECTOMY      CHOLECYSTECTOMY      [place] INSJ TUNNELED CTR VAD W/SUBQ PORT AGE 5 YR/> Left 2/4/2016    Procedure: INSERTION VENOUS PORT (PORT-A-CATH);   Surgeon: Trenton Alvarado MD;  [facility];  Service: Surgical Oncology    TONSILLECTOMY       Family History   Problem Relation Age of Onset    Skin cancer Mother     Skin cancer Father     Skin cancer Brother     Stroke Maternal Grandmother      Social History     Socioeconomic History    Marital status: /Civil Union     Spouse name: Not on file    Number of children: 2    Years of education: Not on file    Highest education level: Not on file   Occupational History    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   Social Needs    Financial resource strain: Not on file    Food insecurity:     Worry: Not on file     Inability: Not on file    Transportation needs:     Medical: Not on file     Non-medical: Not on file   Tobacco Use    Smoking status: Former Smoker     Types: Cigarettes     Last attempt to quit: 2012     Years since quittin 1    Smokeless tobacco: Never Used    Tobacco comment: 1 ppd x 10 years and quit in   then 1 1/2 years and quit in    Substance and Sexual Activity    Alcohol use: Yes     Comment: occasional    Drug use: No    Sexual activity: Not on file   Lifestyle    Physical activity:     Days per week: Not on file     Minutes per session: Not on file    Stress: Not on file   Relationships    Social connections:     Talks on phone: Not on file     Gets together: Not on file     Attends [employer] service: Not on file     Active member of club or organization: Not on file     Attends meetings of clubs or organizations: Not on file     Relationship status: Not on file    Intimate partner violence:     Fear of current or ex partner: Not on file     Emotionally abused: Not on file     Physically abused: Not on file     Forced sexual activity: Not on file   Other Topics Concern    Not on file   Social History Narrative    Not on file       Current Outpatient Medications:     cholecalciferol (VITAMIN D3) 1,000 units tablet, Take by mouth daily, Disp: , Rfl:     Cyanocobalamin (VITAMIN B 12 PO), Take 1 drop by mouth daily, Disp: , Rfl:     dexlansoprazole (DEXILANT) 60 MG capsule, Take by mouth, Disp: , Rfl:     docusate sodium (DULCOLAX) 100 mg capsule, Take by mouth, Disp: , Rfl:     levothyroxine 88 mcg tablet, Take by mouth, Disp: , Rfl:     loratadine (CLARITIN) 10 mg tablet, Take 10 mg by mouth as needed for allergies  , Disp: , Rfl:     simvastatin (ZOCOR) 20 mg tablet, , Disp: , Rfl:     TURMERIC PO, Take 1 tablet by mouth daily, Disp: , Rfl:   Allergies   Allergen Reactions    Chocolate     Corn Oil     Dairy Aid [Lactase]     Dust Mite Extract     Ibuprofen Hives    Mold Extract [Trichophyton]     Pollen Extract     Aleve [Naproxen Sodium] Rash    Neulasta [Pegfilgrastim] Hives     Dr Ivette Cadena is aware and will continue Neulasta with the pt taking Zyrtec and having Benadryl PRN for hives  This occurred with her first dose of Neulasta     Tylenol [Acetaminophen] Rash     Vitals:    02/27/19 1015   BP: 120/78   Pulse: 74   Resp: 16   Temp: 98 4 °F (36 9 °C)       Physical Exam   Constitutional: She is oriented to person, place, and time  She appears well-developed and well-nourished  HENT:   Head: Normocephalic and atraumatic  Right Ear: External ear normal    Left Ear: External ear normal    Eyes: Pupils are equal, round, and reactive to light  EOM are normal    Neck: Normal range of motion  Neck supple  Cardiovascular: Normal rate, regular rhythm, normal heart sounds and intact distal pulses  Pulmonary/Chest: Effort normal and breath sounds normal    Abdominal: Soft  Bowel sounds are normal    Musculoskeletal: Normal range of motion  Neurological: She is alert and oriented to person, place, and time  Skin: Skin is warm and dry  Right breast Incision/scar well healed  No evidence of recurrence visible or palpable  Right axilla clinically negative  Left breast axilla normal to inspection and palpation  Psychiatric: She has a normal mood and affect  Her behavior is normal  Judgment and thought content normal          Results:  Labs:  none    Imaging    DIAGNOSIS: Breast cancer, stage 2, right ([address] )     RELEVANT HISTORY:   Family Breast Cancer History: No known family history of breast cancer  Family Medical history: No relevant family history has been documented for this patient  Personal History: No relevant hormone history has been documented for this patient  Surgical history includes lumpectomy   Medical history includes history of radiation therapy and history of chemotherapy      COMPARISONS:  Multiple prior exams dated between 11/05/2010 and 01/09/2018      INDICATION: Gagan Hernandez is a 48 y o  female presenting for cancer surveillance      TECHNOLOSGIST: Rosalba Marcos     VIEWS:  2D views: CC, XCCL views of left, right breast(s) were taken  2D synth views: MLO views of right, left breast(s) were taken  3D views: MLO views of right, left breast(s) were taken      TECHNIQUE:  The current study was evaluated with Computer Aided Detection      TISSUE DENSITY:   There are scattered areas of fibroglandular density         RISK ASSESSMENT:   5 Year Tyrer-Cuzick: 1 01 %  10 Year Tyrer-Cuzick: 2 19 %  Lifetime Tyrer-Cuzick: 8 15 %     FINDINGS:   Bilateral  There are no suspicious masses, grouped microcalcifications or areas of architectural distortion  The skin and nipple areolar complex are unremarkable  Biopsy marker clip is noted in the right breast   Post treatment changes are noted in the right breast   Benign-appearing calcifications are noted in each breast   Intramammary lymph nodes in the upper outer left breast demonstrate long-term stability      IMPRESSION:   No evidence of malignancy            ASSESSMENT/BI-RADS CATEGORY:  Left: 2 - Benign  Right: 2 - Benign  Overall: 2 - Benign     RECOMMENDATION:       - Routine Screening Mammogram in 1 year for both breasts      Workstation ID: [id number]  No results found  I reviewed the above laboratory and imaging data  Discussion/Summary:  History of stage II right breast cancer, now clinically without evidence of recurrence  Plan on follow-up in 6 months for surveillance per guidelines

## 2019-03-08 ENCOUNTER — RADIATION ONCOLOGY FOLLOW-UP (OUTPATIENT)
Dept: RADIATION ONCOLOGY | Facility: HOSPITAL | Age: 54
End: 2019-03-08
Attending: RADIOLOGY
Payer: COMMERCIAL

## 2019-03-08 VITALS
SYSTOLIC BLOOD PRESSURE: 116 MMHG | HEART RATE: 93 BPM | WEIGHT: 141.4 LBS | RESPIRATION RATE: 16 BRPM | TEMPERATURE: 98.3 F | BODY MASS INDEX: 27.76 KG/M2 | OXYGEN SATURATION: 98 % | DIASTOLIC BLOOD PRESSURE: 60 MMHG | HEIGHT: 60 IN

## 2019-03-08 DIAGNOSIS — Z85.3 HISTORY OF BREAST CANCER: Primary | ICD-10-CM

## 2019-03-08 PROCEDURE — 99215 OFFICE O/P EST HI 40 MIN: CPT | Performed by: RADIOLOGY

## 2019-03-08 NOTE — PROGRESS NOTES
Chitra Varma  1965   Ms Varma is a 48 y o  female       Chief Complaint   Patient presents with    Follow-up       Cancer Staging  No matching staging information was found for the patient  History of breast cancer    12/22/2015 Initial Diagnosis     Breast cancer ([facility] [address] )         12/22/2015 Biopsy     Right breast biopsy  Invasive breast cancer, Grade 3/3  ER/[place] negative  HER 2 negative    Right axillary lymph node biopsy  Metastatic carcinoma similar to the mammary carcinoma          12/31/2015 Surgery     Right lumpectomy with axillary dissection  Invasive breast carcinoma, Grade 3  2/36 nodes positive; extranodal extension seen          Cancer Staged     [id number], Stage IIB, invasive ductal carcinoma of the right breast, Grade III, triple negative         1/21/2016 Genetic Testing     Negative         2/9/2016 - 3/22/2016 Chemotherapy     Adriamycin/Cytoxan q 2 weeks x4         4/5/2016 - 5/17/2016 Chemotherapy     Taxol q 2 weeks x 4         6/15/2016 - 7/28/2016 Radiation     right breast and supraclavicular region to a total dose of 5000 cGy in 25 daily fractions  An additional 1000 cGy boost in 5 daily fractions was delivered to the lumpectomy cavity            Clinical Trial: no        Interval History: Presents for annual follow up       11/20/18 Tod Raring:   No evidence of recurrent disease  I recommended her to continue with surveillance  F/U 1 year  1/15/19 Mammo bilateral: Left: 2 - Benign  Right: 2 - Benign  Overall: 2 - Benign      2/27/19 Abe DuranOnc: 6 month follow up  Right breast Incision/scar well healed  No evidence of recurrence visible or palpable  Right axilla clinically negative  Left breast axilla normal to inspection and palpation  Follow up in 6 months for surveillance             Screening  Tobacco  Current tobacco user: no  If yes, brief counseling provided: NA    Hypertension  Hypertension screening performed: yes  Normotensive:  yes  If no, referred to PCP: n/a    Depression Screening  Screened for depression using PHQ-2: yes    Screened for depression using PHQ-9:  no  Screening positive or negative:  negative  If score >4, was any of the following actions taken? Additional evaluation for depression, suicide risk assesment, referral to PCP or psychiatry, medication started:  [id number] [facility] >65 [facility]  Advanced Care Planning Discussed:  n/a  Patient named surrogate decision maker or care plan in chart: n/a    Health Maintenance   Topic Date Due    Hepatitis C Screening  1965    Depression Screening PHQ  1965    CRC Screening: Colonoscopy  1965    BMI: Followup Plan  05/23/1983    Pneumococcal PPSV23 Highest Risk Adult (1 of 3 - PCV13) 05/23/1984    PAP SMEAR  12/13/2019    BMI: Adult  02/27/2020    MAMMOGRAM  01/15/2021    DTaP,Tdap,and Td Vaccines (2 - Td) 03/13/2027    INFLUENZA VACCINE  Completed    HEPATITIS B VACCINES  Aged Out       Patient Active Problem List   Diagnosis    History of breast cancer    Gastroesophageal reflux disease without esophagitis    Hypothyroidism    Lymphedema    Vaginal atrophy    Encounter for follow-up examination after completed treatment for malignant neoplasm     Past Medical History:   Diagnosis Date    Cancer [facility] [address] [facility]     GERD (gastroesophageal reflux disease)     History of chemotherapy 2016    8 treatments    History of radiation therapy     Hypothyroidism      Past Surgical History:   Procedure Laterality Date    AXILLARY NODE DISSECTION Right     BREAST LUMPECTOMY Right     BUNIONECTOMY      CHOLECYSTECTOMY      [place] INSJ TUNNELED CTR VAD W/SUBQ PORT AGE 5 YR/> Left 2/4/2016    Procedure: INSERTION VENOUS PORT (PORT-A-CATH);   Surgeon: Lj Conley MD;  [facility];  Service: Surgical Oncology    TONSILLECTOMY       Family History   Problem Relation Age of Onset    Skin cancer Mother     Skin cancer Father     Skin cancer Brother     Stroke Maternal Grandmother      Social History     Socioeconomic History    Marital status: /Civil Union     Spouse name: Not on file    Number of children: 2    Years of education: Not on file    Highest education level: Not on file   Occupational History    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   Social Needs    Financial resource strain: Not on file    Food insecurity:     Worry: Not on file     Inability: Not on file    Transportation needs:     Medical: Not on file     Non-medical: Not on file   Tobacco Use    Smoking status: Former Smoker     Types: Cigarettes     Last attempt to quit:      Years since quittin 1    Smokeless tobacco: Never Used    Tobacco comment: 1 ppd x 10 years and quit in   then 1 1/2 years and quit in    Substance and Sexual Activity    Alcohol use: Not Currently    Drug use: No    Sexual activity: Not on file   Lifestyle    Physical activity:     Days per week: Not on file     Minutes per session: Not on file    Stress: Not on file   Relationships    Social connections:     Talks on phone: Not on file     Gets together: Not on file     Attends [employer] service: Not on file     Active member of club or organization: Not on file     Attends meetings of clubs or organizations: Not on file     Relationship status: Not on file    Intimate partner violence:     Fear of current or ex partner: Not on file     Emotionally abused: Not on file     Physically abused: Not on file     Forced sexual activity: Not on file   Other Topics Concern    Not on file   Social History Narrative    Not on file       Current Outpatient Medications:     cholecalciferol (VITAMIN D3) 1,000 units tablet, Take by mouth daily, Disp: , Rfl:     Cyanocobalamin (VITAMIN B 12 PO), Take 1 drop by mouth daily, Disp: , Rfl:     dexlansoprazole (DEXILANT) 60 MG capsule, Take 60 mg by mouth daily , Disp: , Rfl:     docusate sodium (DULCOLAX) 100 mg capsule, Take 100 mg by mouth daily , Disp: , Rfl:     levothyroxine 88 mcg tablet, Take 88 mcg by mouth daily , Disp: , Rfl:     loratadine (CLARITIN) 10 mg tablet, Take 10 mg by mouth as needed for allergies  , Disp: , Rfl:     simvastatin (ZOCOR) 20 mg tablet, Take 20 mg by mouth daily at bedtime , Disp: , Rfl:     TURMERIC PO, Take 1 tablet by mouth daily, Disp: , Rfl:   Allergies   Allergen Reactions    Dairy Aid [Lactase] GI Intolerance    Ibuprofen Hives    Pollen Extract Rash    Aleve [Naproxen Sodium] Rash    Chocolate     Corn Oil Rash     Corn     Dust Mite Extract Rash    Mold Extract [Trichophyton] Rash    Neulasta [Pegfilgrastim] Hives     Dr Lyndsey Olmstead is aware and will continue Neulasta with the pt taking Zyrtec and having Benadryl PRN for hives  This occurred with her first dose of Neulasta     Tylenol [Acetaminophen] Rash       Review of Systems:  Review of Systems   Constitutional: Negative  HENT: Positive for ear pain (Chronic left ear pain ) and sore throat (Mild  Started today  )  Eyes: Negative  Respiratory: Negative  Cardiovascular: Negative  Gastrointestinal: Negative  Endocrine:        Decreased hot flashes  Genitourinary:        Nocturia x 1  Musculoskeletal: Positive for arthralgias (Neck, hips, and hands ) and neck stiffness  Skin: Negative  Allergic/Immunologic: Positive for environmental allergies and food allergies  Neurological: Negative  Hematological: Negative  Psychiatric/Behavioral: The patient is nervous/anxious (Mild )  Vitals:    03/08/19 0929   BP: 116/60   BP Location: Left arm   Patient Position: Sitting   Cuff Size: Standard   Pulse: 93   Resp: 16   Temp: 98 3 °F (36 8 °C)   TempSrc: Temporal   SpO2: 98%   Weight: 64 1 kg (141 lb 6 4 oz)   Height: 5' (1 524 m)       Pain Score: 0-No pain    Imaging:No results found      Teaching

## 2019-03-08 NOTE — PROGRESS NOTES
Follow-up - [address] 1965 48 y o  female [id number]      History of Present Illness   Cancer Staging  No matching staging information was found for the patient  Angely Lozano returns today for routine scheduled follow-up visit  Overall she feels quite well  She denies any discomfort in the treated breast   No lymphedema, persistent headache, new focal musculoskeletal aches or pains  Interval History: Presents for annual follow up       [facility]   No evidence of recurrent disease  I recommended her to continue with surveillance  F/U 1 year  1/15/19 Mammo bilateral: Left: 2 - Benign  Right: 2 - Benign  Overall: 2 - Benign      2/27/19 Collin Parksc: 6 month follow up  Right breast Incision/scar well healed  No evidence of recurrence visible or palpable  Right axilla clinically negative  Left breast axilla normal to inspection and palpation  Follow up in 6 months for surveillance  Historical Information      History of breast cancer    12/22/2015 Initial Diagnosis     Breast cancer ([facility] [address] )         12/22/2015 Biopsy     Right breast biopsy  Invasive breast cancer, Grade 3/3  ER/[place] negative  HER 2 negative    Right axillary lymph node biopsy  Metastatic carcinoma similar to the mammary carcinoma          12/31/2015 Surgery     Right lumpectomy with axillary dissection  Invasive breast carcinoma, Grade 3  2/36 nodes positive; extranodal extension seen          Cancer Staged     [id number], Stage IIB, invasive ductal carcinoma of the right breast, Grade III, triple negative         1/21/2016 Genetic Testing     Negative         2/9/2016 - 3/22/2016 Chemotherapy     Adriamycin/Cytoxan q 2 weeks x4         4/5/2016 - 5/17/2016 Chemotherapy     Taxol q 2 weeks x 4         6/15/2016 - 7/28/2016 Radiation     right breast and supraclavicular region to a total dose of 5000 cGy in 25 daily fractions  An additional 1000 cGy boost in 5 daily fractions was delivered to the lumpectomy cavity            Past Medical History:   Diagnosis Date    Cancer ([facility] [address] [facility]     GERD (gastroesophageal reflux disease)     History of chemotherapy     8 treatments    History of radiation therapy     Hypothyroidism      Past Surgical History:   Procedure Laterality Date    AXILLARY NODE DISSECTION Right     BREAST LUMPECTOMY Right     BUNIONECTOMY      CHOLECYSTECTOMY      [place] INSJ TUNNELED CTR VAD W/SUBQ PORT AGE 5 YR/> Left 2016    Procedure: INSERTION VENOUS PORT (PORT-A-CATH);   Surgeon: Chadwick Mack MD;  [facility];  Service: Surgical Oncology    TONSILLECTOMY         Social History   Social History     Substance and Sexual Activity   Alcohol Use Not Currently     Social History     Substance and Sexual Activity   Drug Use No     Social History     Tobacco Use   Smoking Status Former Smoker    Types: Cigarettes    Last attempt to quit:     Years since quittin 1   Smokeless Tobacco Never Used   Tobacco Comment    1 ppd x 10 years and quit in  [address]  then 1 1/2 years and quit in          Meds/Allergies     Current Outpatient Medications:     cholecalciferol (VITAMIN D3) 1,000 units tablet, Take by mouth daily, Disp: , Rfl:     Cyanocobalamin (VITAMIN B 12 PO), Take 1 drop by mouth daily, Disp: , Rfl:     dexlansoprazole (DEXILANT) 60 MG capsule, Take 60 mg by mouth daily , Disp: , Rfl:     docusate sodium (DULCOLAX) 100 mg capsule, Take 100 mg by mouth daily , Disp: , Rfl:     levothyroxine 88 mcg tablet, Take 88 mcg by mouth daily , Disp: , Rfl:     loratadine (CLARITIN) 10 mg tablet, Take 10 mg by mouth as needed for allergies  , Disp: , Rfl:     simvastatin (ZOCOR) 20 mg tablet, Take 20 mg by mouth daily at bedtime , Disp: , Rfl:     TURMERIC PO, Take 1 tablet by mouth daily, Disp: , Rfl:   Allergies   Allergen Reactions    Dairy Aid [Lactase] GI Intolerance    Ibuprofen Hives    Pollen Extract Rash    Aleve [Naproxen Sodium] Rash    Chocolate     Corn Oil Rash     Corn     Dust Mite Extract Rash    Mold Extract [Trichophyton] Rash    Neulasta [Pegfilgrastim] Hives     Dr Dm Fisher is aware and will continue Neulasta with the pt taking Zyrtec and having Benadryl PRN for hives  This occurred with her first dose of Neulasta     Tylenol [Acetaminophen] Rash         Review of Systems   Review of Systems   Constitutional: Negative  HENT: Positive for ear pain (Chronic left ear pain ) and sore throat (Mild  Started today  )  Eyes: Negative  Respiratory: Negative  Cardiovascular: Negative  Gastrointestinal: Negative  Endocrine:        Decreased hot flashes  Genitourinary:        Nocturia x 1  Musculoskeletal: Positive for arthralgias (Neck, hips, and hands ) and neck stiffness  Skin: Negative  Allergic/Immunologic: Positive for environmental allergies and food allergies  Neurological: Negative  Hematological: Negative  Psychiatric/Behavioral: The patient is nervous/anxious (Mild  )  Screening  Tobacco  Current tobacco user: no  If yes, brief counseling provided: NA    Hypertension  Hypertension screening performed: yes  Normotensive:  yes  If no, referred to PCP: n/a    Depression Screening  Screened for depression using PHQ-2: yes    Screened for depression using PHQ-9:  no  Screening positive or negative:  negative  If score >4, was any of the following actions taken?    Additional evaluation for depression, suicide risk assesment, referral to PCP or psychiatry, medication started:  n/a    Advanced Care Planning for Patients >65 years  Advanced Care Planning Discussed:  n/a  Patient named surrogate decision maker or care plan in chart: n/a         OBJECTIVE:   /60 (BP Location: Left arm, Patient Position: Sitting, Cuff Size: Standard)   Pulse 93   Temp 98 3 °F (36 8 °C) (Temporal)   Resp 16   Ht 5' (1 524 m)   Wt 64 1 kg (141 lb 6 4 oz)   SpO2 98%   BMI 27 62 kg/m²   Pain Assessment:  0  Karnofsky: 90 - Able to carry on normal activity; minor signs or symptoms of disease     Physical Exam   The patient presents today no apparent distress  Sclera anicteric  No cervical supraclavicular lymphadenopathy  Lungs clear to auscultation bilaterally  Normal S1-S2 regular rate and rhythm  The left breast is within normal limits  The right breast is soft, nontender, with a small degree of scar tissue deep to the lumpectomy site  No axillary lymphadenopathy  No lymphedema  RESULTS    Lab Results: No results found for this or any previous visit (from the past 672 hour(s))  Imaging Studies:No results found  Assessment/Plan:  No orders of the defined types were placed in this encounter  Genie Galaviz has done well in follow-up and has no clinical evidence of recurrent disease at this time  She will continue to follow closely with both Medical and Surgical Oncology will return to our department in 1 year for routine follow-up  Kevin Ugarte MD  3/8/2019,9:50 AM    Portions of the record may have been created with voice recognition software   Occasional wrong word or "sound a like" substitutions may have occurred due to the inherent limitations of voice recognition software   Read the chart carefully and recognize, using context, where substitutions have occurred

## 2019-09-10 ENCOUNTER — OFFICE VISIT (OUTPATIENT)
Dept: SURGICAL ONCOLOGY | Facility: CLINIC | Age: 54
End: 2019-09-10
Payer: COMMERCIAL

## 2019-09-10 VITALS
BODY MASS INDEX: 26.7 KG/M2 | TEMPERATURE: 97.6 F | HEIGHT: 60 IN | SYSTOLIC BLOOD PRESSURE: 140 MMHG | WEIGHT: 136 LBS | HEART RATE: 81 BPM | DIASTOLIC BLOOD PRESSURE: 60 MMHG | RESPIRATION RATE: 16 BRPM

## 2019-09-10 DIAGNOSIS — Z85.3 HISTORY OF BREAST CANCER: ICD-10-CM

## 2019-09-10 DIAGNOSIS — Z08 ENCOUNTER FOR FOLLOW-UP EXAMINATION AFTER COMPLETED TREATMENT FOR MALIGNANT NEOPLASM: Primary | ICD-10-CM

## 2019-09-10 PROCEDURE — 99213 OFFICE O/P EST LOW 20 MIN: CPT | Performed by: NURSE PRACTITIONER

## 2019-09-10 NOTE — PROGRESS NOTES
Surgical Oncology Follow Up       [address]  [facility] SURGICAL ONCOLOGY [place]  [address] [facility] [address]    Sallie James [address]  1965  [id number]  [address] [facility]  CANCER [facility] SURGICAL ONCOLOGY [place]  [facility] [id number]    Chief Complaint   Patient presents with    Breast Cancer     Pt is here for 6 month follow up        Assessment/Plan:  1  Encounter for follow-up examination after completed treatment for malignant neoplasm    2  History of breast cancer  - Mammo diagnostic bilateral w 3d & cad; Future  - 6 mo f/u visit with Dr Arabella Rodriguez    Discussion/Summary:  Patient is a 35-year-old female who presents today for a six-month follow-up visit for right breast cancer diagnosed in 2015  Her pathology revealed invasive carcinoma, grade 3, ER/NE/HER2 negative  She did a positive lymph node  She underwent a right lumpectomy and axillary dissection by Dr Arabella Rodriguez  She had 2/36 positive lymph nodes  This was a stage IIB  She underwent genetic testing which was negative  She completed adjuvant chemotherapy and whole breast radiation therapy  She had a bilateral 3D diagnostic mammogram on January 15, 2019 which was BI-RADS 2, category 2 density  She has no new complaints today and there are no concerns on today's exam   I instructed her to call if she ever has persistent breast pain or if she appreciates any changes on self-breast exam   I will order a bilateral mammogram to be performed in January and we will see the patient back in 6 months or sooner if the need arises  She was instructed to call with any new concerns or symptoms prior to that time  All of her questions were answered today  History of Present Illness:        History of breast cancer    12/22/2015 Initial Diagnosis     Breast cancer ([facility] [address] )      12/22/2015 Biopsy     Right breast biopsy  Invasive breast cancer, Grade 3/3  ER/NE negative    HER 2 negative    Right axillary lymph node biopsy  Metastatic carcinoma similar to the mammary carcinoma       12/31/2015 Surgery     Right lumpectomy with axillary dissection  Invasive breast carcinoma, Grade 3  2/36 nodes positive; extranodal extension seen       Cancer Staged     [id number], Stage IIB, invasive ductal carcinoma of the right breast, Grade III, triple negative      1/21/2016 Genetic Testing     Negative      2/9/2016 - 3/22/2016 Chemotherapy     Adriamycin/Cytoxan q 2 weeks x4      4/5/2016 - 5/17/2016 Chemotherapy     Taxol q 2 weeks x 4      6/15/2016 - 7/28/2016 Radiation     right breast and supraclavicular region to a total dose of 5000 cGy in 25 daily fractions  An additional 1000 cGy boost in 5 daily fractions was delivered to the lumpectomy cavity          -Interval History:  Patient presents today for a six-month follow-up visit for right breast cancer diagnosed in 2015  Denies headaches, back pain, bone pain, cough, shortness of breath, abdominal pain  She notices no changes on self-exam   She reports occasional left breast pains which dissipate quickly  Review of Systems:  Review of Systems   Constitutional: Negative for activity change, appetite change, chills, fatigue, fever and unexpected weight change  HENT: Negative for trouble swallowing  Eyes: Negative for pain, redness and visual disturbance  Respiratory: Negative for cough, shortness of breath and wheezing  Cardiovascular: Negative for chest pain, palpitations and leg swelling  Gastrointestinal: Negative for abdominal pain, constipation, diarrhea, nausea and vomiting  Endocrine: Negative for cold intolerance and heat intolerance  Musculoskeletal: Negative for arthralgias, back pain, gait problem and myalgias  Skin: Negative for color change and rash  Neurological: Negative for dizziness, syncope, light-headedness, numbness and headaches  Hematological: Negative for adenopathy     Psychiatric/Behavioral: Negative for agitation and confusion  All other systems reviewed and are negative  Patient Active Problem List   Diagnosis    History of breast cancer    Gastroesophageal reflux disease without esophagitis    Hypothyroidism    Lymphedema    Vaginal atrophy    Encounter for follow-up examination after completed treatment for malignant neoplasm     Past Medical History:   Diagnosis Date    Cancer ([facility] [address] [facility]     GERD (gastroesophageal reflux disease)     History of chemotherapy     8 treatments    History of radiation therapy     Hypothyroidism      Past Surgical History:   Procedure Laterality Date    AXILLARY NODE DISSECTION Right     BREAST LUMPECTOMY Right     BUNIONECTOMY      CHOLECYSTECTOMY      [place] INSJ TUNNELED CTR VAD W/SUBQ PORT AGE 5 YR/> Left 2016    Procedure: INSERTION VENOUS PORT (PORT-A-CATH);   Surgeon: Riya Lara MD;  [facility]: [facility];  Service: Surgical Oncology    TONSILLECTOMY       Family History   Problem Relation Age of Onset    Skin cancer Mother     Skin cancer Father     Skin cancer Brother     Stroke Maternal Grandmother      Social History     Socioeconomic History    Marital status: /Civil Union     Spouse name: Not on file    Number of children: 2    Years of education: Not on file    Highest education level: Not on file   Occupational History    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   Social Needs    Financial resource strain: Not on file    Food insecurity:     Worry: Not on file     Inability: Not on file    Transportation needs:     Medical: Not on file     Non-medical: Not on file   Tobacco Use    Smoking status: Former Smoker     Types: Cigarettes     Last attempt to quit:      Years since quittin 6    Smokeless tobacco: Never Used    Tobacco comment: 1 ppd x 10 years and quit in   then 1 1/2 years and quit in    Substance and Sexual Activity    Alcohol use: Not Currently    Drug use: No    Sexual activity: Not on file Lifestyle    Physical activity:     Days per week: Not on file     Minutes per session: Not on file    Stress: Not on file   Relationships    Social connections:     Talks on phone: Not on file     Gets together: Not on file     Attends [employer] service: Not on file     Active member of club or organization: Not on file     Attends meetings of clubs or organizations: Not on file     Relationship status: Not on file    Intimate partner violence:     Fear of current or ex partner: Not on file     Emotionally abused: Not on file     Physically abused: Not on file     Forced sexual activity: Not on file   Other Topics Concern    Not on file   Social History Narrative    Not on file       Current Outpatient Medications:     cholecalciferol (VITAMIN D3) 1,000 units tablet, Take by mouth daily, Disp: , Rfl:     dexlansoprazole (DEXILANT) 60 MG capsule, Take 60 mg by mouth daily , Disp: , Rfl:     docusate sodium (DULCOLAX) 100 mg capsule, Take 100 mg by mouth daily , Disp: , Rfl:     levothyroxine 88 mcg tablet, Take 88 mcg by mouth daily , Disp: , Rfl:     loratadine (CLARITIN) 10 mg tablet, Take 10 mg by mouth as needed for allergies  , Disp: , Rfl:     simvastatin (ZOCOR) 20 mg tablet, Take 20 mg by mouth daily at bedtime , Disp: , Rfl:     TURMERIC PO, Take 1 tablet by mouth daily, Disp: , Rfl:     Cyanocobalamin (VITAMIN B 12 PO), Take 1 drop by mouth daily, Disp: , Rfl:   Allergies   Allergen Reactions    Dairy Aid [Lactase] GI Intolerance    Ibuprofen Hives    Pollen Extract Rash    Aleve [Naproxen Sodium] Rash    Chocolate     Corn Oil Rash     Corn     Dust Mite Extract Rash    Mold Extract [Trichophyton] Rash    Neulasta [Pegfilgrastim] Hives     Dr Bianca Buenrostro is aware and will continue Neulasta with the pt taking Zyrtec and having Benadryl PRN for hives  This occurred with her first dose of Neulasta     Tylenol [Acetaminophen] Rash     Vitals:    09/10/19 0902   BP: 140/60   Pulse: 81 Resp: 16   Temp: 97 6 °F (36 4 °C)       Physical Exam   Constitutional: She is oriented to person, place, and time  Vital signs are normal  She appears well-developed and well-nourished  No distress  HENT:   Head: Normocephalic and atraumatic  Neck: Normal range of motion  Cardiovascular: Normal rate, regular rhythm and normal heart sounds  Pulmonary/Chest: Effort normal and breath sounds normal    Bilateral breasts were examined in the sitting and supine position  Right breast and right axillary surgical scars present  There are no masses, skin nodules, nipple changes or nipple discharge  There is no bilateral supraclavicular or axillary lymphadenopathy noted  No obvious right arm lymphedema noted  Abdominal: Soft  Normal appearance  She exhibits no mass  There is no hepatosplenomegaly  There is no tenderness  Musculoskeletal: Normal range of motion  Lymphadenopathy:     She has no axillary adenopathy  Right: No supraclavicular adenopathy present  Left: No supraclavicular adenopathy present  Neurological: She is alert and oriented to person, place, and time  Skin: Skin is warm, dry and intact  No rash noted  She is not diaphoretic  Psychiatric: She has a normal mood and affect  Her speech is normal    Vitals reviewed  Advance Care Planning/Advance Directives:  Discussed disease status, cancer treatment plans and/or cancer treatment goals with the patient

## 2019-11-19 ENCOUNTER — OFFICE VISIT (OUTPATIENT)
Dept: HEMATOLOGY ONCOLOGY | Facility: CLINIC | Age: 54
End: 2019-11-19
Payer: COMMERCIAL

## 2019-11-19 VITALS
TEMPERATURE: 94.6 F | HEIGHT: 60 IN | BODY MASS INDEX: 27.29 KG/M2 | WEIGHT: 139 LBS | RESPIRATION RATE: 18 BRPM | OXYGEN SATURATION: 99 % | SYSTOLIC BLOOD PRESSURE: 118 MMHG | DIASTOLIC BLOOD PRESSURE: 62 MMHG | HEART RATE: 58 BPM

## 2019-11-19 DIAGNOSIS — Z85.3 HISTORY OF BREAST CANCER: Primary | ICD-10-CM

## 2019-11-19 PROCEDURE — 99214 OFFICE O/P EST MOD 30 MIN: CPT | Performed by: INTERNAL MEDICINE

## 2019-11-19 NOTE — PROGRESS NOTES
Hematology / Oncology Outpatient Follow Up Note    Derek Mchugh 47 y o  female :1965 XTE:[id number]         Date:  2019    Assessment / Plan:  A 47year old postmenopausal woman with stage IIB right breast cancer, grade 3, triple-negative disease  She is negative for BRCA gene mutation  She underwent lumpectomy with axillary lymph node dissection resulting in MILTON  She had 3 positive lymph nodes  She completed adjuvant chemotherapy with dose dense AC , followed by dose dense paclitaxel  She is currently on observation  Clinically, she has no evidence recurrent disease  I recommended her to continue with surveillance  I will see her again in a year for routine follow-up  She is in agreement with my recommendations              Subjective:      HPI:  A 48year old postmenopausal woman who noticed a lump in the right breast, which she brought to medical attention in early 2015  She was found to have abnormality, based on imaging study for which she underwent ultrasound-guided biopsy not only from right breast mass, as well as right axillary lymph node, both of which showed invasive ductal carcinoma  She was seen by Dr Aristeo Thacker who did lumpectomy with axillary lymph node dissection in 2015  She had 2 2x2  2x1 9 cm of invasive ductal carcinoma, grade 3  There was lymphovascular invasion  3/36 axillary lymph nodes positive for metastatic disease  This was ER/WA negative, HER-2 2+ disease  HER-2 fish was negative for gene amplification  She presents today to discuss adjuvant treatment options  She is scheduled to have port placement in 2016 by Dr Aristeo Thacker  She has no breast related symptomatology  She has no complaint of pain  She denied respiratory symptoms  Her weight is stable  Prior to the surgery, she underwent CT scan of chest and pelvis which showed no evidence of metastatic disease  She had 2 4 mm of pulmonary nodule in the right lower lobe   She was seen by genetic counselor  Her BRCA testing is pending at this time  She is otherwise healthy  She has hypothyroidism as well as hiatal hernia  He only takes 2 medications for each  She does not have a strong family history of breast cancer, at least in her first-degree latitude to however, maternal great aunt had breast cancer  She has 3 older sisters none of whom has history of breast or ovarian cancer  Her performance status is normal             Interval History:  A 51 year old postmenopausal woman with stage II B right breast cancer, grade 3, triple-negative disease  She underwent lumpectomy with axillary lymph node dissection, resulting in MILTON  She had 3 positive axillary lymph nodes  She is negative for BRCA gene mutation  She completed adjuvant chemotherapy with dose dense AC , followed by paclitaxel  In May 2016  Subsequently, she had adjuvant radiation therapy with no skin toxicity  She presented today for follow-up  She has absolutely no new complaint  She denied any pain  Her weight is stable  She has no respiratory symptoms such as cough, sputum production or shortness of breath  She is up to date for colonoscopy  Her last mammography in January 2019 was benign  Her performance status is normal         Objective:      Primary Diagnosis:     1  Right breast cancer, stage IIB (pT2, pN1a, M0)  Grade 3, ER/FL negative, HER-2 negative disease  Diagnosed in December 2015   2  BRCA gene mutation negative       Cancer Staging:  Cancer Staging  No matching staging information was found for the patient         Previous Hematologic/ Oncologic Treatment:      1  Adjuvant chemotherapy with dose dense AC x4 followed by dose dense paclitaxel x4  Completed in May 2016      Current Hematologic/ Oncologic Treatment:       Observation      Disease Status:      MILTON status post lumpectomy with axillary lymph node dissection       Test Results:     Pathology:     2 2x2  2x1 9 cm of invasive ductal carcinoma, grade 3   There is evidence of lymphovascular invasion  3/36, axillary lymph node were positive for metastatic disease  ER/IN negative, HER-2 negative disease  Stage IIB(pT2, pN1a,M0)     Radiology:     Mammography in January 2019 was benign   BI-RADS 2      Laboratory:      CMP in April 2018 were all within normal limits      Physical Exam:        General Appearance:    Alert, oriented          Eyes:    PERRL   Ears:    Normal external ear canals, both ears   Nose:   Nares normal, septum midline   Throat:   Mucosa moist  Pharynx without injection  Neck:   Supple         Lungs:     Clear to auscultation bilaterally   Chest Wall:    No tenderness or deformity    Heart:    Regular rate and rhythm         Abdomen:     Soft, non-tender, bowel sounds +, no organomegaly               Extremities:   Extremities no cyanosis or edema         Skin:   no rash or icterus  Lymph nodes:   Cervical, supraclavicular, and axillary nodes normal   Neurologic:   CNII-XII intact, normal strength, sensation and reflexes     Throughout             Breast exam:   lumpectomy scar at outer upper quadrant of the right breast with no palpable abnormalities  Left breast exam is negative  ROS: Review of Systems   All other systems reviewed and are negative  Imaging: No results found  Labs:   Lab Results   Component Value Date    WBC 4 08 (L) 07/27/2016    HGB 12 8 07/27/2016    HCT 40 5 07/27/2016    MCV 87 07/27/2016     07/27/2016     Lab Results   Component Value Date     12/28/2015    K 4 1 05/16/2016     05/16/2016    CO2 27 05/16/2016    ANIONGAP 10 12/28/2015    BUN 16 05/16/2016    CREATININE 0 80 05/16/2016    GLUCOSE 100 12/28/2015    CALCIUM 9 1 05/16/2016    AST 22 05/16/2016    ALT 28 05/16/2016    ALKPHOS 160 (H) 05/16/2016    EGFR >60 0 05/16/2016           Current Medications: Reviewed  Allergies: Reviewed  PMH/FH/SH:  Reviewed      Vital Sign:    Body surface area is 1 6 meters squared      Wt Readings from Last 3 Encounters:   11/19/19 63 kg (139 lb)   09/10/19 61 7 kg (136 lb)   03/08/19 64 1 kg (141 lb 6 4 oz)        Temp Readings from Last 3 Encounters:   11/19/19 (!) 94 6 °F (34 8 °C) (Tympanic Core)   09/10/19 97 6 °F (36 4 °C) (Temporal)   03/08/19 98 3 °F (36 8 °C) (Temporal)        BP Readings from Last 3 Encounters:   11/19/19 118/62   09/10/19 140/60   03/08/19 116/60         Pulse Readings from Last 3 Encounters:   11/19/19 58   09/10/19 81   03/08/19 93     @LASTSAO2(3)@

## 2020-01-21 ENCOUNTER — HOSPITAL ENCOUNTER (OUTPATIENT)
Dept: MAMMOGRAPHY | Facility: CLINIC | Age: 55
Discharge: HOME | End: 2020-01-21
Payer: COMMERCIAL

## 2020-01-21 VITALS — HEIGHT: 60 IN | BODY MASS INDEX: 27.29 KG/M2 | WEIGHT: 139 LBS

## 2020-01-21 DIAGNOSIS — Z85.3 HISTORY OF BREAST CANCER: ICD-10-CM

## 2020-01-21 PROCEDURE — G0279 TOMOSYNTHESIS, MAMMO: HCPCS

## 2020-01-21 PROCEDURE — 77066 DX MAMMO INCL CAD BI: CPT

## 2020-03-06 ENCOUNTER — CLINICAL SUPPORT (OUTPATIENT)
Dept: RADIATION ONCOLOGY | Facility: HOSPITAL | Age: 55
End: 2020-03-06
Attending: RADIOLOGY
Payer: COMMERCIAL

## 2020-03-06 VITALS
HEART RATE: 66 BPM | SYSTOLIC BLOOD PRESSURE: 108 MMHG | RESPIRATION RATE: 16 BRPM | BODY MASS INDEX: 26.9 KG/M2 | WEIGHT: 137 LBS | HEIGHT: 60 IN | DIASTOLIC BLOOD PRESSURE: 60 MMHG | TEMPERATURE: 97.9 F

## 2020-03-06 DIAGNOSIS — Z85.3 HISTORY OF BREAST CANCER: Primary | ICD-10-CM

## 2020-03-06 PROCEDURE — 99211 OFF/OP EST MAY X REQ PHY/QHP: CPT | Performed by: RADIOLOGY

## 2020-03-06 NOTE — PROGRESS NOTES
Merline Bran Martin [address] 1965 is a 47 y o  female       Follow up visit   Presents for annual follow up for breast cancer  Completed radiation on 7/28/16  Last visit on 3/8/19     9/10/19 Mary Condon: No changes on exam  F/U 6 months  11/19/19 Natalia Burton HemOnc:   She is currently on observation  Clinically, she has no evidence recurrent disease  I recommended her to continue with surveillance  I will see her again in a year for routine follow-up    1/21/2020 Mammo: Left: 2 - Benign  Right: 2 - Benign  Overall: 2 - Benign    3/11/2020 SurgOnc  11/24/2020 HemOn    Reports intermittent lymphedema  Has sleeve  History of breast cancer    12/22/2015 Biopsy     Right breast biopsy  Invasive breast cancer, Grade 3/3  ER/OK negative  HER 2 negative    Right axillary lymph node biopsy  Metastatic carcinoma similar to the mammary carcinoma       12/31/2015 Surgery     Right lumpectomy with axillary dissection  Invasive breast carcinoma, Grade 3  2/36 nodes positive; extranodal extension seen       Cancer Staged     [id number], Stage IIB, invasive ductal carcinoma of the right breast, Grade III, triple negative      1/21/2016 Genetic Testing     Negative      2/9/2016 - 3/22/2016 Chemotherapy     Adriamycin/Cytoxan q 2 weeks x4      4/5/2016 - 5/17/2016 Chemotherapy     Taxol q 2 weeks x 4      6/15/2016 - 7/28/2016 Radiation     right breast and supraclavicular region to a total dose of 5000 cGy in 25 daily fractions  An additional 1000 cGy boost in 5 daily fractions was delivered to the lumpectomy cavity     Clinical Trial: no    Health Maintenance   Topic Date Due    Hepatitis C Screening  1965    CRC Screening: Colonoscopy  1965    HIV Screening  05/23/1980    BMI: Followup Plan  05/23/1983    Annual Physical  05/23/1983    Influenza Vaccine  07/01/2019    Cervical Cancer Screening  12/13/2019    Depression Screening PHQ  03/08/2020    BMI: Adult  01/21/2021    MAMMOGRAM  01/21/2022  DTaP,Tdap,and Td Vaccines (2 - Td) 03/13/2027    Pneumococcal Vaccine: 65+ Years (1 of 2 - PCV13) 05/23/2030    Pneumococcal Vaccine: Pediatrics (0 to 5 Years) and At-Risk Patients (6 to 59 Years)  Aged Out    HIB Vaccine  Aged Out    Hepatitis B Vaccine  Aged Out    IPV Vaccine  Aged Out    Hepatitis A Vaccine  Aged Out    Meningococcal ACWY Vaccine  Aged Out    HPV Vaccine  Aged Out       Patient Active Problem List   Diagnosis    History of breast cancer    Gastroesophageal reflux disease without esophagitis    Hypothyroidism    Lymphedema    Vaginal atrophy    Encounter for follow-up surveillance of breast cancer     Past Medical History:   Diagnosis Date    BRCA1 negative     BRCA2 negative     Cancer ([facility] [address]     GERD (gastroesophageal reflux disease)     History of chemotherapy 2016    8 treatments    History of radiation therapy     Hypothyroidism      Past Surgical History:   Procedure Laterality Date    AXILLARY NODE DISSECTION Right     BREAST LUMPECTOMY Right     12/2015    BUNIONECTOMY      CHOLECYSTECTOMY      [place] INSJ TUNNELED CTR VAD W/SUBQ PORT AGE 5 YR/> Left 2/4/2016    Procedure: INSERTION VENOUS PORT (PORT-A-CATH);   Surgeon: Tonya Lesch, MD;  [facility];  Service: Surgical Oncology    TONSILLECTOMY       Family History   Problem Relation Age of Onset    Skin cancer Mother     Skin cancer Father     Skin cancer Brother     Stroke Maternal Grandmother      Social History     Socioeconomic History    Marital status: /Civil Union     Spouse name: Not on file    Number of children: 2    Years of education: Not on file    Highest education level: Not on file   Occupational History    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   Social Needs    Financial resource strain: Not on file    Food insecurity:     Worry: Not on file     Inability: Not on file    Transportation needs:     Medical: Not on file     Non-medical: Not on file   Tobacco Use    Smoking status: Former Smoker     Types: Cigarettes     Last attempt to quit:      Years since quittin 1    Smokeless tobacco: Never Used    Tobacco comment: 1 ppd x 10 years and quit in [address]  then 1 1/2 years and quit in    Substance and Sexual Activity    Alcohol use: Not Currently    Drug use: No    Sexual activity: Not on file   Lifestyle    Physical activity:     Days per week: Not on file     Minutes per session: Not on file    Stress: Not on file   Relationships    Social connections:     Talks on phone: Not on file     Gets together: Not on file     Attends Synagogue service: Not on file     Active member of club or organization: Not on file     Attends meetings of clubs or organizations: Not on file     Relationship status: Not on file    Intimate partner violence:     Fear of current or ex partner: Not on file     Emotionally abused: Not on file     Physically abused: Not on file     Forced sexual activity: Not on file   Other Topics Concern    Not on file   Social History Narrative    Not on file       Current Outpatient Medications:     cholecalciferol (VITAMIN D3) 1,000 units tablet, Take by mouth daily, Disp: , Rfl:     dexlansoprazole (DEXILANT) 60 MG capsule, Take 60 mg by mouth daily , Disp: , Rfl:     docusate sodium (DULCOLAX) 100 mg capsule, Take 100 mg by mouth daily , Disp: , Rfl:     levothyroxine 88 mcg tablet, Take 88 mcg by mouth daily , Disp: , Rfl:     loratadine (CLARITIN) 10 mg tablet, Take 10 mg by mouth as needed for allergies  , Disp: , Rfl:     simvastatin (ZOCOR) 20 mg tablet, Take 20 mg by mouth daily at bedtime , Disp: , Rfl:     TURMERIC PO, Take 1 tablet by mouth daily, Disp: , Rfl:   Allergies   Allergen Reactions    Dairy Aid [Lactase] GI Intolerance    Ibuprofen Hives    Pollen Extract Rash    Aleve [Naproxen Sodium] Rash    Chocolate     Corn Oil Rash     Corn     Dust Mite Extract Rash    Mold Extract [Trichophyton] Rash    Neulasta [Pegfilgrastim] Hives     Dr Helena Watt is aware and will continue Neulasta with the pt taking Zyrtec and having Benadryl PRN for hives  This occurred with her first dose of Neulasta     Tylenol [Acetaminophen] Rash     Review of Systems:  Review of Systems   Constitutional: Negative  HENT: Positive for ear pain (Intermittent, chronic in left ear)  Eyes: Negative  Respiratory: Negative  Cardiovascular: Negative  Gastrointestinal: Negative  Endocrine: Negative  Genitourinary:        Stable Nocturia x 1  Musculoskeletal: Positive for arthralgias (Neck, hips, and hands ) and neck stiffness  Skin: Negative  Allergic/Immunologic: Positive for environmental allergies and food allergies  Neurological: Positive for numbness (Tingling in thumbs at times  )  Hematological: Negative  Psychiatric/Behavioral: The patient is nervous/anxious (Mild )  Vitals:    03/06/20 1300   BP: 108/60   BP Location: Left arm   Patient Position: Sitting   Cuff Size: Standard   Pulse: 66   Resp: 16   Temp: 97 9 °F (36 6 °C)   TempSrc: Temporal   Weight: 62 1 kg (137 lb)   Height: 5' (1 524 m)        Pain Score: 0-No pain      Imaging:No results found

## 2020-03-06 NOTE — PROGRESS NOTES
Follow-up - [address] 1965 47 y o  female [id number]      History of Present Illness   Cancer Staging  No matching staging information was found for the patient  Shahab Daniels returns today for routine scheduled follow-up visit  Overall she feels well  She does report some slight possible peripheral neuropathy of the fingertips and toes but otherwise is without complaints  Presents for annual follow up for breast cancer  Completed radiation on 7/28/16  Last visit on 3/8/19     9/10/19 Ailin Tuttle: No changes on exam  F/U 6 months  11/19/19 Ida Gibson HemOnc:   She is currently on observation  Clinically, she has no evidence recurrent disease  I recommended her to continue with surveillance  I will see her again in a year for routine follow-up    1/21/2020 Mammo: Left: 2 - Benign  Right: 2 - Benign  Overall: 2 - Benign    3/11/2020 SurgOnc  11/24/2020 HemOnc    Reports intermittent lymphedema  Has sleeve  Historical Information      History of breast cancer    12/22/2015 Biopsy     Right breast biopsy  Invasive breast cancer, Grade 3/3  ER/[place] negative  HER 2 negative    Right axillary lymph node biopsy  Metastatic carcinoma similar to the mammary carcinoma       12/31/2015 Surgery     Right lumpectomy with axillary dissection  Invasive breast carcinoma, Grade 3  2/36 nodes positive; extranodal extension seen       Cancer Staged     [id number], Stage IIB, invasive ductal carcinoma of the right breast, Grade III, triple negative      1/21/2016 Genetic Testing     Negative      2/9/2016 - 3/22/2016 Chemotherapy     Adriamycin/Cytoxan q 2 weeks x4      4/5/2016 - 5/17/2016 Chemotherapy     Taxol q 2 weeks x 4      6/15/2016 - 7/28/2016 Radiation     right breast and supraclavicular region to a total dose of 5000 cGy in 25 daily fractions  An additional 1000 cGy boost in 5 daily fractions was delivered to the lumpectomy cavity         Past Medical History:   Diagnosis Date    BRCA1 negative     BRCA2 negative     Cancer (HCC)     GERD (gastroesophageal reflux disease)     History of chemotherapy     8 treatments    History of radiation therapy     Hypothyroidism      Past Surgical History:   Procedure Laterality Date    AXILLARY NODE DISSECTION Right     BREAST LUMPECTOMY Right     2015    BUNIONECTOMY      CHOLECYSTECTOMY      [place] INSJ TUNNELED CTR VAD W/SUBQ PORT AGE 5 YR/> Left 2016    Procedure: INSERTION VENOUS PORT (PORT-A-CATH);   Surgeon: Con Kramer MD;  [facility];  Service: Surgical Oncology    TONSILLECTOMY         Social History   Social History     Substance and Sexual Activity   Alcohol Use Not Currently     Social History     Substance and Sexual Activity   Drug Use No     Social History     Tobacco Use   Smoking Status Former Smoker    Types: Cigarettes    Last attempt to quit:     Years since quittin 1   Smokeless Tobacco Never Used   Tobacco Comment    1 ppd x 10 years and quit in  [address]  then 1 1/2 years and quit in          Meds/Allergies     Current Outpatient Medications:     cholecalciferol (VITAMIN D3) 1,000 units tablet, Take by mouth daily, Disp: , Rfl:     dexlansoprazole (DEXILANT) 60 MG capsule, Take 60 mg by mouth daily , Disp: , Rfl:     docusate sodium (DULCOLAX) 100 mg capsule, Take 100 mg by mouth daily , Disp: , Rfl:     levothyroxine 88 mcg tablet, Take 88 mcg by mouth daily , Disp: , Rfl:     loratadine (CLARITIN) 10 mg tablet, Take 10 mg by mouth as needed for allergies  , Disp: , Rfl:     simvastatin (ZOCOR) 20 mg tablet, Take 20 mg by mouth daily at bedtime , Disp: , Rfl:     TURMERIC PO, Take 1 tablet by mouth daily, Disp: , Rfl:   Allergies   Allergen Reactions    Dairy Aid [Lactase] GI Intolerance    Ibuprofen Hives    Pollen Extract Rash    Aleve [Naproxen Sodium] Rash    Chocolate     Corn Oil Rash     Corn     Dust Mite Extract Rash    Mold Extract [Trichophyton] Rash    Neulasta [Pegfilgrastim] Hives     Dr Tino Palencia is aware and will continue Neulasta with the pt taking Zyrtec and having Benadryl PRN for hives  This occurred with her first dose of Neulasta     Tylenol [Acetaminophen] Rash         Review of Systems   Review of Systems   Constitutional: Negative  HENT: Positive for ear pain (Intermittent, chronic in left ear)  Eyes: Negative  Respiratory: Negative  Cardiovascular: Negative  Gastrointestinal: Negative  Endocrine: Negative  Genitourinary:        Stable Nocturia x 1  Musculoskeletal: Positive for arthralgias (Neck, hips, and hands ) and neck stiffness  Skin: Negative  Allergic/Immunologic: Positive for environmental allergies and food allergies  Neurological: Positive for numbness (Tingling in thumbs at times  )  Hematological: Negative  Psychiatric/Behavioral: The patient is nervous/anxious (Mild )  OBJECTIVE:   /60 (BP Location: Left arm, Patient Position: Sitting, Cuff Size: Standard)   Pulse 66   Temp 97 9 °F (36 6 °C) (Temporal)   Resp 16   Ht 5' (1 524 m)   Wt 62 1 kg (137 lb)   BMI 26 76 kg/m²   Pain Assessment:  0  Karnofsky: 90 - Able to carry on normal activity; minor signs or symptoms of disease     Physical Exam   The patient presents today no apparent distress  Sclera anicteric  No palpable cervical or supraclavicular lymphadenopathy  Lungs clear to auscultation bilaterally  Normal S1-S2 regular rate and rhythm  The breasts are within normal limits bilaterally  No axillary lymphadenopathy  No obvious lymphedema  Good range of motion of the shoulders bilaterally  Normal speech  Normal affect  RESULTS    Lab Results: No results found for this or any previous visit (from the past 672 hour(s))  Imaging Studies:No results found  Assessment/Plan:  No orders of the defined types were placed in this encounter         Alida Calderon has done well in follow-up and has no clinical evidence of recurrent disease  She will continue to follow closely both surgical Medical Oncology will return to our department in 1 year for routine follow-up  Dipika Pang MD  3/6/2020,1:33 PM    Portions of the record may have been created with voice recognition software   Occasional wrong word or "sound a like" substitutions may have occurred due to the inherent limitations of voice recognition software   Read the chart carefully and recognize, using context, where substitutions have occurred

## 2020-03-11 ENCOUNTER — OFFICE VISIT (OUTPATIENT)
Dept: SURGICAL ONCOLOGY | Facility: CLINIC | Age: 55
End: 2020-03-11
Payer: COMMERCIAL

## 2020-03-11 VITALS
WEIGHT: 135 LBS | SYSTOLIC BLOOD PRESSURE: 104 MMHG | HEIGHT: 60 IN | BODY MASS INDEX: 26.5 KG/M2 | HEART RATE: 69 BPM | DIASTOLIC BLOOD PRESSURE: 66 MMHG | TEMPERATURE: 98.8 F | RESPIRATION RATE: 16 BRPM

## 2020-03-11 DIAGNOSIS — Z08 ENCOUNTER FOR FOLLOW-UP SURVEILLANCE OF BREAST CANCER: Primary | ICD-10-CM

## 2020-03-11 DIAGNOSIS — Z85.3 ENCOUNTER FOR FOLLOW-UP SURVEILLANCE OF BREAST CANCER: Primary | ICD-10-CM

## 2020-03-11 DIAGNOSIS — Z85.3 HISTORY OF BREAST CANCER: ICD-10-CM

## 2020-03-11 PROCEDURE — 99213 OFFICE O/P EST LOW 20 MIN: CPT | Performed by: SURGERY

## 2020-03-11 NOTE — PROGRESS NOTES
Surgical Oncology Follow Up       [address] [facility],[address] [facility]  [facility] [place]  [address] [facility] [place] [address]    Sallie [address]  1965  [id number]  [address] [facility],[address] [place] [address]  [facility] [place]  [address] [facility] [address]    Chief Complaint   Patient presents with    Follow-up     6 month follow up  Assessment/Plan:    No problem-specific Assessment & Plan notes found for this encounter  Diagnoses and all orders for this visit:    Encounter for follow-up surveillance of breast cancer    History of breast cancer        Advance Care Planning/Advance Directives:  Discussed disease status, cancer treatment plans and/or cancer treatment goals with the patient  History of breast cancer    12/22/2015 Biopsy     Right breast biopsy  Invasive breast cancer, Grade 3/3  ER/[place] negative  HER 2 negative    Right axillary lymph node biopsy  Metastatic carcinoma similar to the mammary carcinoma       12/31/2015 Surgery     Right lumpectomy with axillary dissection  Invasive breast carcinoma, Grade 3  2/36 nodes positive; extranodal extension seen       Cancer Staged     [id number], Stage IIB, invasive ductal carcinoma of the right breast, Grade III, triple negative      1/21/2016 Genetic Testing     Negative      2/9/2016 - 3/22/2016 Chemotherapy     Adriamycin/Cytoxan q 2 weeks x4      4/5/2016 - 5/17/2016 Chemotherapy     Taxol q 2 weeks x 4      6/15/2016 - 7/28/2016 Radiation     right breast and supraclavicular region to a total dose of 5000 cGy in 25 daily fractions  An additional 1000 cGy boost in 5 daily fractions was delivered to the lumpectomy cavity         History of Present Illness:  Patient is here for surveillance visit with no new complaints report  -Interval History:  She had a mammogram done in anticipation of today's visit  Review of Systems:  Review of Systems   Constitutional: Negative      HENT: Negative  Eyes: Negative  Respiratory: Negative  Cardiovascular: Negative  Gastrointestinal: Negative  Endocrine: Negative  Genitourinary: Negative  Musculoskeletal: Negative  Skin: Negative  Allergic/Immunologic: Negative  Neurological: Negative  Hematological: Negative  Psychiatric/Behavioral: Negative  Patient Active Problem List   Diagnosis    History of breast cancer    Gastroesophageal reflux disease without esophagitis    Hypothyroidism    Lymphedema    Vaginal atrophy    Encounter for follow-up surveillance of breast cancer     Past Medical History:   Diagnosis Date    BRCA1 negative     BRCA2 negative     Cancer ([facility] [address] )     GERD (gastroesophageal reflux disease)     History of chemotherapy 2016    8 treatments    History of radiation therapy     Hypothyroidism      Past Surgical History:   Procedure Laterality Date    AXILLARY NODE DISSECTION Right     BREAST LUMPECTOMY Right     12/2015    BUNIONECTOMY      CHOLECYSTECTOMY      [place] INSJ TUNNELED CTR VAD W/SUBQ PORT AGE 5 YR/> Left 2/4/2016    Procedure: INSERTION VENOUS PORT (PORT-A-CATH);   Surgeon: Jennifer Payan MD;  [facility];  Service: Surgical Oncology    TONSILLECTOMY       Family History   Problem Relation Age of Onset    Skin cancer Mother     Skin cancer Father     Skin cancer Brother     Stroke Maternal Grandmother      Social History     Socioeconomic History    Marital status: /Civil Union     Spouse name: Not on file    Number of children: 2    Years of education: Not on file    Highest education level: Not on file   Occupational History    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   Social Needs    Financial resource strain: Not on file    Food insecurity:     Worry: Not on file     Inability: Not on file    Transportation needs:     Medical: Not on file     Non-medical: Not on file   Tobacco Use    Smoking status: Former Smoker     Types: Cigarettes     Last attempt to quit: 2012     Years since quittin 1    Smokeless tobacco: Never Used    Tobacco comment: 1 ppd x 10 years and quit in [address]  then 1 1/2 years and quit in    Substance and Sexual Activity    Alcohol use: Not Currently    Drug use: No    Sexual activity: Not on file   Lifestyle    Physical activity:     Days per week: Not on file     Minutes per session: Not on file    Stress: Not on file   Relationships    Social connections:     Talks on phone: Not on file     Gets together: Not on file     Attends [employer] service: Not on file     Active member of club or organization: Not on file     Attends meetings of clubs or organizations: Not on file     Relationship status: Not on file    Intimate partner violence:     Fear of current or ex partner: Not on file     Emotionally abused: Not on file     Physically abused: Not on file     Forced sexual activity: Not on file   Other Topics Concern    Not on file   Social History Narrative    Not on file       Current Outpatient Medications:     cholecalciferol (VITAMIN D3) 1,000 units tablet, Take by mouth daily, Disp: , Rfl:     dexlansoprazole (DEXILANT) 60 MG capsule, Take 60 mg by mouth daily , Disp: , Rfl:     docusate sodium (DULCOLAX) 100 mg capsule, Take 100 mg by mouth daily , Disp: , Rfl:     levothyroxine 88 mcg tablet, Take 88 mcg by mouth daily , Disp: , Rfl:     loratadine (CLARITIN) 10 mg tablet, Take 10 mg by mouth as needed for allergies  , Disp: , Rfl:     simvastatin (ZOCOR) 20 mg tablet, Take 20 mg by mouth daily at bedtime , Disp: , Rfl:     TURMERIC PO, Take 1 tablet by mouth daily, Disp: , Rfl:   Allergies   Allergen Reactions    Dairy Aid [Lactase] GI Intolerance    Ibuprofen Hives    Pollen Extract Rash    Aleve [Naproxen Sodium] Rash    Chocolate     Corn Oil Rash     Corn     Dust Mite Extract Rash    Mold Extract [Trichophyton] Rash    Neulasta [Pegfilgrastim] Hives     Dr Fifi Tucker is aware and will continue Neulasta with the pt taking Zyrtec and having Benadryl PRN for hives  This occurred with her first dose of Neulasta     Tylenol [Acetaminophen] Rash     Vitals:    03/11/20 1540   BP: 104/66   Pulse: 69   Resp: 16   Temp: 98 8 °F (37 1 °C)       Physical Exam   Constitutional: She is oriented to person, place, and time  She appears well-developed and well-nourished  HENT:   Head: Normocephalic and atraumatic  Right Ear: External ear normal    Left Ear: External ear normal    Eyes: Pupils are equal, round, and reactive to light  EOM are normal    Neck: Normal range of motion  Neck supple  Cardiovascular: Normal rate, regular rhythm and normal heart sounds  Pulmonary/Chest: Effort normal and breath sounds normal    Abdominal: Soft  Bowel sounds are normal    Musculoskeletal: Normal range of motion  Neurological: She is alert and oriented to person, place, and time  Skin: Skin is warm and dry  Breasts: breasts appear normal, no suspicious masses, no skin or nipple changes or axillary nodes  Psychiatric: She has a normal mood and affect  Her behavior is normal  Judgment and thought content normal          Results:  Labs:  none    Imaging  DIAGNOSIS: History of breast cancer      TECHNIQUE:  Digital screening mammography was performed  Computer Aided Detection (CAD) analyzed all applicable images  COMPARISONS: Prior breast imaging dated: 01/15/2019, 01/09/2018, 01/03/2017, 01/05/2016, 12/22/2015, 06/02/2015, 05/13/2014, and 05/02/2013     RELEVANT HISTORY:   Family Breast Cancer History: No known family history of breast cancer  Family Medical History: No known relevant family medical history  Personal History: No known relevant hormone history  Surgical history includes lumpectomy   Medical history includes BRCA 1 negative, BRCA 2 negative, and history of chemotherapy      RISK ASSESSMENT:   5 Year Tyrer-Cuzick: 1 03 %  10 Year Tyrer-Cuzick: 2 26 %  Lifetime Tyrer-Cuzick: 7 98 %     TISSUE DENSITY:   There are scattered areas of fibroglandular density         INDICATION: Jam Conley is a 47 y o  female presenting for annual      FINDINGS:   Bilateral  There are no suspicious masses, grouped microcalcifications or areas of architectural distortion  The skin and nipple areolar complex are unremarkable         IMPRESSION:   No evidence of malignancy            ASSESSMENT/BI-RADS CATEGORY:  Left: 2 - Benign  Right: 2 - Benign  Overall: 2 - Benign     RECOMMENDATION:       - Diagnostic mammogram in 1 year for both breasts      Workstation ID: [id number]  I reviewed the above laboratory and imaging data  Discussion/Summary:  48 for woman, history of stage II breast cancer now MILTON 4 5 years post treatment  Will plan on follow-up in 6 months    She is due for mammogram and 1 year period

## 2020-09-04 ENCOUNTER — TELEPHONE (OUTPATIENT)
Dept: SURGICAL ONCOLOGY | Facility: CLINIC | Age: 55
End: 2020-09-04

## 2020-09-08 ENCOUNTER — TELEPHONE (OUTPATIENT)
Dept: SURGICAL ONCOLOGY | Facility: CLINIC | Age: 55
End: 2020-09-08

## 2020-09-09 ENCOUNTER — OFFICE VISIT (OUTPATIENT)
Dept: SURGICAL ONCOLOGY | Facility: CLINIC | Age: 55
End: 2020-09-09
Payer: COMMERCIAL

## 2020-09-09 VITALS
DIASTOLIC BLOOD PRESSURE: 78 MMHG | TEMPERATURE: 98.3 F | RESPIRATION RATE: 16 BRPM | HEIGHT: 60 IN | SYSTOLIC BLOOD PRESSURE: 110 MMHG | HEART RATE: 73 BPM | WEIGHT: 142 LBS | BODY MASS INDEX: 27.88 KG/M2

## 2020-09-09 DIAGNOSIS — Z85.3 HISTORY OF BREAST CANCER: ICD-10-CM

## 2020-09-09 DIAGNOSIS — Z08 ENCOUNTER FOR FOLLOW-UP SURVEILLANCE OF BREAST CANCER: Primary | ICD-10-CM

## 2020-09-09 DIAGNOSIS — Z85.3 ENCOUNTER FOR FOLLOW-UP SURVEILLANCE OF BREAST CANCER: Primary | ICD-10-CM

## 2020-09-09 PROCEDURE — 99213 OFFICE O/P EST LOW 20 MIN: CPT | Performed by: SURGERY

## 2020-09-09 NOTE — LETTER
September 9, 2020     [facility]  [address] [facility] [address]    Patient: Tray Ellis   YOB: 1965   Date of Visit: 9/9/2020       Dear Dr Nahid Short:    Thank you for referring Claudia Velásquez to me for evaluation  Below are my notes for this consultation  If you have questions, please do not hesitate to call me  I look forward to following your patient along with you  Sincerely,        Steffen Mandujano MD        CC: MD Pastor Saldaña MD Briana Hue, MD Slater Croft, MD  9/9/2020  4:01 PM  Sign when Signing Visit     Surgical Oncology Follow Up       [address] [place] [address] [address] [place] [address] [facility] [address]  1965  [id number]  [address] [place] [address] SURGICAL ONCOLOGY [place] [address] [address] [place] [address]    Chief Complaint   Patient presents with    Follow-up     6 month follow up       Assessment/Plan:    No problem-specific Assessment & Plan notes found for this encounter  Diagnoses and all orders for this visit:    Encounter for follow-up surveillance of breast cancer    History of breast cancer        Advance Care Planning/Advance Directives:  Discussed disease status, cancer treatment plans and/or cancer treatment goals with the patient  Oncology History   History of breast cancer   12/22/2015 Biopsy    Right breast biopsy  Invasive breast cancer, Grade 3/3  ER/IA negative    HER 2 negative    Right axillary lymph node biopsy  Metastatic carcinoma similar to the mammary carcinoma      12/31/2015 Surgery    Right lumpectomy with axillary dissection  Invasive breast carcinoma, Grade 3  2/36 nodes positive; extranodal extension seen      Cancer Staged    [id number], Stage IIB, invasive ductal carcinoma of the right breast, Grade III, triple negative     1/21/2016 Genetic Testing    Negative 2/9/2016 - 3/22/2016 Chemotherapy    Adriamycin/Cytoxan q 2 weeks x4     4/5/2016 - 5/17/2016 Chemotherapy    Taxol q 2 weeks x 4     6/15/2016 - 7/28/2016 Radiation    right breast and supraclavicular region to a total dose of 5000 cGy in 25 daily fractions  An additional 1000 cGy boost in 5 daily fractions was delivered to the lumpectomy cavity         History of Present Illness:  Patient is a 20-year-old woman here for surveillance visit  She is status post right breast lumpectomy, axillary dissection, chemotherapy, radiation   -Interval History:  She is 4 and half years post treatment  Last mammogram January of this year  No issues or complaints to report  Review of Systems:  Review of Systems   Constitutional: Negative  HENT: Negative  Eyes: Negative  Respiratory: Negative  Cardiovascular: Negative  Gastrointestinal: Negative  Endocrine: Negative  Genitourinary: Negative  Musculoskeletal: Negative  Skin: Negative  Allergic/Immunologic: Negative  Neurological: Negative  Hematological: Negative  Psychiatric/Behavioral: Negative  Patient Active Problem List   Diagnosis    History of breast cancer    Gastroesophageal reflux disease without esophagitis    Hypothyroidism    Lymphedema    Vaginal atrophy    Encounter for follow-up surveillance of breast cancer     Past Medical History:   Diagnosis Date    BRCA1 negative     BRCA2 negative     [facility] [address]     GERD (gastroesophageal reflux disease)     History of chemotherapy 2016    8 treatments    History of radiation therapy     Hypothyroidism     Shingles 07/2020     Past Surgical History:   Procedure Laterality Date    AXILLARY NODE DISSECTION Right     BREAST LUMPECTOMY Right     12/2015    BUNIONECTOMY      CHOLECYSTECTOMY      NM INSJ TUNNELED CTR VAD W/SUBQ PORT AGE 5 YR/> Left 2/4/2016    Procedure: INSERTION VENOUS PORT (PORT-A-CATH);   Surgeon: Geronimo Carmen MD;  [facility]; Service: Surgical Oncology    TONSILLECTOMY       Family History   Problem Relation Age of Onset    Skin cancer Mother     Skin cancer Father     Skin cancer Brother     Stroke Maternal Grandmother      Social History     Socioeconomic History    Marital status: /Civil Union     Spouse name: Not on file    Number of children: 2    Years of education: Not on file    Highest education level: Not on file   Occupational History    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   Social Needs    Financial resource strain: Not on file    Food insecurity     Worry: Not on file     Inability: Not on file    Transportation needs     Medical: Not on file     Non-medical: Not on file   Tobacco Use    Smoking status: Former Smoker     Types: Cigarettes     Last attempt to quit:      Years since quittin 6    Smokeless tobacco: Never Used    Tobacco comment: 1 ppd x 10 years and quit in   then 1 1/2 years and quit in    Substance and Sexual Activity    Alcohol use: Not Currently    Drug use: No    Sexual activity: Not on file   Lifestyle    Physical activity     Days per week: Not on file     Minutes per session: Not on file    Stress: Not on file   Relationships    Social connections     Talks on phone: Not on file     Gets together: Not on file     Attends Yazidism service: Not on file     Active member of club or organization: Not on file     Attends meetings of clubs or organizations: Not on file     Relationship status: Not on file    Intimate partner violence     Fear of current or ex partner: Not on file     Emotionally abused: Not on file     Physically abused: Not on file     Forced sexual activity: Not on file   Other Topics Concern    Not on file   Social History Narrative    Not on file       Current Outpatient Medications:     cholecalciferol (VITAMIN D3) 1,000 units tablet, Take by mouth daily, Disp: , Rfl:     dexlansoprazole (DEXILANT) 60 MG capsule, Take 60 mg by mouth daily , Disp: , Rfl:     docusate sodium (DULCOLAX) 100 mg capsule, Take 100 mg by mouth daily , Disp: , Rfl:     levothyroxine 100 mcg tablet, Take 100 mcg by mouth daily , Disp: , Rfl:     simvastatin (ZOCOR) 20 mg tablet, Take 20 mg by mouth daily at bedtime , Disp: , Rfl:     TURMERIC PO, Take 1 tablet by mouth daily, Disp: , Rfl:   Allergies   Allergen Reactions    Dairy Aid [Lactase] GI Intolerance    Ibuprofen Hives    Pollen Extract Rash    Aleve [Naproxen Sodium] Rash    Chocolate     Corn Oil Rash     Corn     Dust Mite Extract Rash    Mold Extract [Trichophyton] Rash    Neulasta [Pegfilgrastim] Hives     Dr Ross Bernheim is aware and will continue Neulasta with the pt taking Zyrtec and having Benadryl PRN for hives  This occurred with her first dose of Neulasta     Tylenol [Acetaminophen] Rash     Vitals:    09/09/20 1538   BP: 110/78   Pulse: 73   Resp: 16   Temp: 98 3 °F (36 8 °C)       Physical Exam  Constitutional:       Appearance: Normal appearance  HENT:      Head: Normocephalic and atraumatic  Nose: Nose normal    Eyes:      Extraocular Movements: Extraocular movements intact  Pupils: Pupils are equal, round, and reactive to light  Neck:      Musculoskeletal: Normal range of motion and neck supple  Cardiovascular:      Rate and Rhythm: Normal rate and regular rhythm  Pulmonary:      Effort: Pulmonary effort is normal       Breath sounds: Normal breath sounds  Abdominal:      General: Abdomen is flat  Palpations: Abdomen is soft  Musculoskeletal: Normal range of motion  Lymphadenopathy:      Cervical: No cervical adenopathy  Skin:     General: Skin is warm and dry  Comments: Bilateral breast exam:  Both breasts and axillae normal to inspection and palpation  No evidence of recurrence in right breast   Neurological:      General: No focal deficit present  Mental Status: She is alert and oriented to person, place, and time     Psychiatric:         Mood and Affect: Mood normal          Behavior: Behavior normal          Thought Content: Thought content normal          Judgment: Judgment normal            Results:  Labs:  none    Imaging  DIAGNOSIS: History of breast cancer      TECHNIQUE:  Digital screening mammography was performed  Computer Aided Detection (CAD) analyzed all applicable images  COMPARISONS: Prior breast imaging dated: 01/15/2019, 01/09/2018, 01/03/2017, 01/05/2016, 12/22/2015, 06/02/2015, 05/13/2014, and 05/02/2013     RELEVANT HISTORY:   Family Breast Cancer History: No known family history of breast cancer  Family Medical History: No known relevant family medical history  Personal History: No known relevant hormone history  Surgical history includes lumpectomy  Medical history includes BRCA 1 negative, BRCA 2 negative, and history of chemotherapy      RISK ASSESSMENT:   5 Year Tyrer-Cuzick: 1 03 %  10 Year Tyrer-Cuzick: 2 26 %  Lifetime Tyrer-Cuzick: 7 98 %     TISSUE DENSITY:   There are scattered areas of fibroglandular density         INDICATION: Manuel Presley is a 47 y o  female presenting for annual      FINDINGS:   Bilateral  There are no suspicious masses, grouped microcalcifications or areas of architectural distortion  The skin and nipple areolar complex are unremarkable         IMPRESSION:   No evidence of malignancy            ASSESSMENT/BI-RADS CATEGORY:  Left: 2 - Benign  Right: 2 - Benign  Overall: 2 - Benign     RECOMMENDATION:       - Diagnostic mammogram in 1 year for both breasts  I reviewed the above laboratory and imaging data  Discussion/Summary:  History of stage II breast cancer  No evidence of recurrence  Follow-up in 6 months  Mammogram due January 2021  Will order for her, and see her afterwards in the spring

## 2020-09-09 NOTE — PROGRESS NOTES
Surgical Oncology Follow Up       [address] [facility],[address] [facility]  [facility] [place]  [address]  [facility] [place] [address]    Valer[address]  1965  [id number]  [address] [facility],[address] [place] [address]  [facility] SURGICAL ONCOLOGY [place]  [address] [place] [address]    Chief Complaint   Patient presents with    Follow-up     6 month follow up       Assessment/Plan:    No problem-specific Assessment & Plan notes found for this encounter  Diagnoses and all orders for this visit:    Encounter for follow-up surveillance of breast cancer    History of breast cancer        Advance Care Planning/Advance Directives:  Discussed disease status, cancer treatment plans and/or cancer treatment goals with the patient  Oncology History   History of breast cancer   12/22/2015 Biopsy    Right breast biopsy  Invasive breast cancer, Grade 3/3  ER/MS negative  HER 2 negative    Right axillary lymph node biopsy  Metastatic carcinoma similar to the mammary carcinoma      12/31/2015 Surgery    Right lumpectomy with axillary dissection  Invasive breast carcinoma, Grade 3  2/36 nodes positive; extranodal extension seen      Cancer Staged    [id number], Stage IIB, invasive ductal carcinoma of the right breast, Grade III, triple negative     1/21/2016 Genetic Testing    Negative     2/9/2016 - 3/22/2016 Chemotherapy    Adriamycin/Cytoxan q 2 weeks x4     4/5/2016 - 5/17/2016 Chemotherapy    Taxol q 2 weeks x 4     6/15/2016 - 7/28/2016 Radiation    right breast and supraclavicular region to a total dose of 5000 cGy in 25 daily fractions  An additional 1000 cGy boost in 5 daily fractions was delivered to the lumpectomy cavity         History of Present Illness:  Patient is a 30-year-old woman here for surveillance visit  She is status post right breast lumpectomy, axillary dissection, chemotherapy, radiation   -Interval History:  She is 4 and half years post treatment    Last mammogram January of this year  No issues or complaints to report  Review of Systems:  Review of Systems   Constitutional: Negative  HENT: Negative  Eyes: Negative  Respiratory: Negative  Cardiovascular: Negative  Gastrointestinal: Negative  Endocrine: Negative  Genitourinary: Negative  Musculoskeletal: Negative  Skin: Negative  Allergic/Immunologic: Negative  Neurological: Negative  Hematological: Negative  Psychiatric/Behavioral: Negative  Patient Active Problem List   Diagnosis    History of breast cancer    Gastroesophageal reflux disease without esophagitis    Hypothyroidism    Lymphedema    Vaginal atrophy    Encounter for follow-up surveillance of breast cancer     Past Medical History:   Diagnosis Date    BRCA1 negative     BRCA2 negative     Cancer ([facility] [address]     GERD (gastroesophageal reflux disease)     History of chemotherapy 2016    8 treatments    History of radiation therapy     Hypothyroidism     Shingles 07/2020     Past Surgical History:   Procedure Laterality Date    AXILLARY NODE DISSECTION Right     BREAST LUMPECTOMY Right     12/2015    BUNIONECTOMY      CHOLECYSTECTOMY      HI INSJ TUNNELED CTR VAD W/SUBQ PORT AGE 5 YR/> Left 2/4/2016    Procedure: INSERTION VENOUS PORT (PORT-A-CATH);   Surgeon: Magdy Rebollar MD;  [facility];  Service: Surgical Oncology    TONSILLECTOMY       Family History   Problem Relation Age of Onset    Skin cancer Mother     Skin cancer Father     Skin cancer Brother     Stroke Maternal Grandmother      Social History     Socioeconomic History    Marital status: /Civil Union     Spouse name: Not on file    Number of children: 2    Years of education: Not on file    Highest education level: Not on file   Occupational History    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   Social Needs    Financial resource strain: Not on file    Food insecurity     Worry: Not on file     Inability: Not on file    Transportation needs     Medical: Not on file     Non-medical: Not on file   Tobacco Use    Smoking status: Former Smoker     Types: Cigarettes     Last attempt to quit: 2012     Years since quittin 6    Smokeless tobacco: Never Used    Tobacco comment: 1 ppd x 10 years and quit in   then 1 1/2 years and quit in    Substance and Sexual Activity    Alcohol use: Not Currently    Drug use: No    Sexual activity: Not on file   Lifestyle    Physical activity     Days per week: Not on file     Minutes per session: Not on file    Stress: Not on file   Relationships    Social connections     Talks on phone: Not on file     Gets together: Not on file     Attends Mandaen service: Not on file     Active member of club or organization: Not on file     Attends meetings of clubs or organizations: Not on file     Relationship status: Not on file    Intimate partner violence     Fear of current or ex partner: Not on file     Emotionally abused: Not on file     Physically abused: Not on file     Forced sexual activity: Not on file   Other Topics Concern    Not on file   Social History Narrative    Not on file       Current Outpatient Medications:     cholecalciferol (VITAMIN D3) 1,000 units tablet, Take by mouth daily, Disp: , Rfl:     dexlansoprazole (DEXILANT) 60 MG capsule, Take 60 mg by mouth daily , Disp: , Rfl:     docusate sodium (DULCOLAX) 100 mg capsule, Take 100 mg by mouth daily , Disp: , Rfl:     levothyroxine 100 mcg tablet, Take 100 mcg by mouth daily , Disp: , Rfl:     simvastatin (ZOCOR) 20 mg tablet, Take 20 mg by mouth daily at bedtime , Disp: , Rfl:     TURMERIC PO, Take 1 tablet by mouth daily, Disp: , Rfl:   Allergies   Allergen Reactions    Dairy Aid [Lactase] GI Intolerance    Ibuprofen Hives    Pollen Extract Rash    Aleve [Naproxen Sodium] Rash    Chocolate     Corn Oil Rash     Corn     Dust Mite Extract Rash    Mold Extract [Trichophyton] Rash    Neulasta [Pegfilgrastim] Hives     Dr Cassandria Lombard is aware and will continue Neulasta with the pt taking Zyrtec and having Benadryl PRN for hives  This occurred with her first dose of Neulasta     Tylenol [Acetaminophen] Rash     Vitals:    09/09/20 1538   BP: 110/78   Pulse: 73   Resp: 16   Temp: 98 3 °F (36 8 °C)       Physical Exam  Constitutional:       Appearance: Normal appearance  HENT:      Head: Normocephalic and atraumatic  Nose: Nose normal    Eyes:      Extraocular Movements: Extraocular movements intact  Pupils: Pupils are equal, round, and reactive to light  Neck:      Musculoskeletal: Normal range of motion and neck supple  Cardiovascular:      Rate and Rhythm: Normal rate and regular rhythm  Pulmonary:      Effort: Pulmonary effort is normal       Breath sounds: Normal breath sounds  Abdominal:      General: Abdomen is flat  Palpations: Abdomen is soft  Musculoskeletal: Normal range of motion  Lymphadenopathy:      Cervical: No cervical adenopathy  Skin:     General: Skin is warm and dry  Comments: Bilateral breast exam:  Both breasts and axillae normal to inspection and palpation  No evidence of recurrence in right breast   Neurological:      General: No focal deficit present  Mental Status: She is alert and oriented to person, place, and time  Psychiatric:         Mood and Affect: Mood normal          Behavior: Behavior normal          Thought Content: Thought content normal          Judgment: Judgment normal            Results:  Labs:  none    Imaging  DIAGNOSIS: History of breast cancer      TECHNIQUE:  Digital screening mammography was performed  Computer Aided Detection (CAD) analyzed all applicable images  COMPARISONS: Prior breast imaging dated: 01/15/2019, 01/09/2018, 01/03/2017, 01/05/2016, 12/22/2015, 06/02/2015, 05/13/2014, and 05/02/2013     RELEVANT HISTORY:   Family Breast Cancer History: No known family history of breast cancer    Family Medical History: No known relevant family medical history  Personal History: No known relevant hormone history  Surgical history includes lumpectomy  Medical history includes BRCA 1 negative, BRCA 2 negative, and history of chemotherapy      RISK ASSESSMENT:   5 Year Tyrer-Cuzick: 1 03 %  10 Year Tyrer-Cuzick: 2 26 %  Lifetime Tyrer-Cuzick: 7 98 %     TISSUE DENSITY:   There are scattered areas of fibroglandular density         INDICATION: Katja Garsia is a 47 y o  female presenting for annual      FINDINGS:   Bilateral  There are no suspicious masses, grouped microcalcifications or areas of architectural distortion  The skin and nipple areolar complex are unremarkable         IMPRESSION:   No evidence of malignancy            ASSESSMENT/BI-RADS CATEGORY:  Left: 2 - Benign  Right: 2 - Benign  Overall: 2 - Benign     RECOMMENDATION:       - Diagnostic mammogram in 1 year for both breasts  I reviewed the above laboratory and imaging data  Discussion/Summary:  History of stage II breast cancer  No evidence of recurrence  Follow-up in 6 months  Mammogram due January 2021  Will order for her, and see her afterwards in the spring

## 2020-10-06 ENCOUNTER — ANNUAL EXAM (OUTPATIENT)
Dept: OBGYN CLINIC | Facility: CLINIC | Age: 55
End: 2020-10-06
Payer: COMMERCIAL

## 2020-10-06 VITALS
BODY MASS INDEX: 28 KG/M2 | SYSTOLIC BLOOD PRESSURE: 112 MMHG | HEIGHT: 60 IN | TEMPERATURE: 97.9 F | DIASTOLIC BLOOD PRESSURE: 66 MMHG | WEIGHT: 142.6 LBS

## 2020-10-06 DIAGNOSIS — N95.2 VAGINAL ATROPHY: ICD-10-CM

## 2020-10-06 DIAGNOSIS — Z12.4 ENCOUNTER FOR SCREENING FOR CERVICAL CANCER: ICD-10-CM

## 2020-10-06 DIAGNOSIS — Z01.419 WELL FEMALE EXAM WITH ROUTINE GYNECOLOGICAL EXAM: Primary | ICD-10-CM

## 2020-10-06 PROCEDURE — 87624 HPV HI-RISK TYP POOLED RSLT: CPT | Performed by: OBSTETRICS & GYNECOLOGY

## 2020-10-06 PROCEDURE — G0145 SCR C/V CYTO,THINLAYER,RESCR: HCPCS | Performed by: OBSTETRICS & GYNECOLOGY

## 2020-10-06 PROCEDURE — S0612 ANNUAL GYNECOLOGICAL EXAMINA: HCPCS | Performed by: OBSTETRICS & GYNECOLOGY

## 2020-10-06 RX ORDER — PRASTERONE 6.5 MG/1
1 INSERT VAGINAL
Qty: 84 EACH | Refills: 3 | Status: SHIPPED | OUTPATIENT
Start: 2020-10-06 | End: 2021-01-29 | Stop reason: SDUPTHER

## 2020-10-11 LAB
HPV HR 12 DNA CVX QL NAA+PROBE: NEGATIVE
HPV16 DNA CVX QL NAA+PROBE: NEGATIVE
HPV18 DNA CVX QL NAA+PROBE: NEGATIVE

## 2020-10-14 LAB
LAB AP GYN PRIMARY INTERPRETATION: NORMAL
Lab: NORMAL

## 2020-10-15 ENCOUNTER — APPOINTMENT (OUTPATIENT)
Dept: RADIOLOGY | Facility: CLINIC | Age: 55
End: 2020-10-15
Payer: COMMERCIAL

## 2020-10-15 ENCOUNTER — TRANSCRIBE ORDERS (OUTPATIENT)
Dept: URGENT CARE | Facility: CLINIC | Age: 55
End: 2020-10-15

## 2020-10-15 DIAGNOSIS — M79.672 LEFT FOOT PAIN: ICD-10-CM

## 2020-10-15 DIAGNOSIS — M79.671 RIGHT FOOT PAIN: ICD-10-CM

## 2020-10-15 DIAGNOSIS — M79.671 RIGHT FOOT PAIN: Primary | ICD-10-CM

## 2020-10-15 PROCEDURE — 73630 X-RAY EXAM OF FOOT: CPT

## 2020-11-11 DIAGNOSIS — Z11.59 SCREENING FOR VIRAL DISEASE: ICD-10-CM

## 2020-11-11 PROCEDURE — U0003 INFECTIOUS AGENT DETECTION BY NUCLEIC ACID (DNA OR RNA); SEVERE ACUTE RESPIRATORY SYNDROME CORONAVIRUS 2 (SARS-COV-2) (CORONAVIRUS DISEASE [COVID-19]), AMPLIFIED PROBE TECHNIQUE, MAKING USE OF HIGH THROUGHPUT TECHNOLOGIES AS DESCRIBED BY CMS-2020-01-R: HCPCS | Performed by: PODIATRIST

## 2020-11-13 ENCOUNTER — ANESTHESIA EVENT (OUTPATIENT)
Dept: PERIOP | Facility: HOSPITAL | Age: 55
End: 2020-11-13
Payer: COMMERCIAL

## 2020-11-13 LAB — SARS-COV-2 RNA SPEC QL NAA+PROBE: NOT DETECTED

## 2020-11-13 RX ORDER — LEVOTHYROXINE SODIUM 88 UG/1
CAPSULE ORAL
COMMUNITY

## 2020-11-13 RX ORDER — LORATADINE 10 MG/1
10 TABLET ORAL DAILY
COMMUNITY
End: 2022-01-31

## 2020-11-16 ENCOUNTER — APPOINTMENT (OUTPATIENT)
Dept: RADIOLOGY | Facility: HOSPITAL | Age: 55
End: 2020-11-16
Payer: COMMERCIAL

## 2020-11-16 ENCOUNTER — TELEPHONE (OUTPATIENT)
Dept: HEMATOLOGY ONCOLOGY | Facility: CLINIC | Age: 55
End: 2020-11-16

## 2020-11-16 ENCOUNTER — HOSPITAL ENCOUNTER (OUTPATIENT)
Facility: HOSPITAL | Age: 55
Setting detail: OUTPATIENT SURGERY
Discharge: HOME | End: 2020-11-16
Attending: PODIATRIST | Admitting: PODIATRIST
Payer: COMMERCIAL

## 2020-11-16 ENCOUNTER — ANESTHESIA (OUTPATIENT)
Dept: PERIOP | Facility: HOSPITAL | Age: 55
End: 2020-11-16
Payer: COMMERCIAL

## 2020-11-16 VITALS
HEIGHT: 60 IN | BODY MASS INDEX: 26.9 KG/M2 | WEIGHT: 137 LBS | OXYGEN SATURATION: 100 % | HEART RATE: 66 BPM | SYSTOLIC BLOOD PRESSURE: 105 MMHG | RESPIRATION RATE: 14 BRPM | DIASTOLIC BLOOD PRESSURE: 58 MMHG | TEMPERATURE: 97.4 F

## 2020-11-16 VITALS — HEART RATE: 56 BPM

## 2020-11-16 DIAGNOSIS — M20.41 OTHER HAMMER TOE(S) (ACQUIRED), RIGHT FOOT: ICD-10-CM

## 2020-11-16 DIAGNOSIS — Z11.59 SCREENING FOR VIRAL DISEASE: Primary | ICD-10-CM

## 2020-11-16 DIAGNOSIS — Z98.890 POST-OPERATIVE STATE: ICD-10-CM

## 2020-11-16 PROCEDURE — C1776 JOINT DEVICE (IMPLANTABLE): HCPCS | Performed by: PODIATRIST

## 2020-11-16 PROCEDURE — 73630 X-RAY EXAM OF FOOT: CPT

## 2020-11-16 DEVICE — INTRAMEDULLARY ARTHRODESIS IMPLANT
Type: IMPLANTABLE DEVICE | Site: FOOT | Status: FUNCTIONAL
Brand: SMART TOE

## 2020-11-16 RX ORDER — PROPOFOL 10 MG/ML
INJECTION, EMULSION INTRAVENOUS AS NEEDED
Status: DISCONTINUED | OUTPATIENT
Start: 2020-11-16 | End: 2020-11-16

## 2020-11-16 RX ORDER — BUPIVACAINE HYDROCHLORIDE 5 MG/ML
INJECTION, SOLUTION PERINEURAL AS NEEDED
Status: DISCONTINUED | OUTPATIENT
Start: 2020-11-16 | End: 2020-11-16 | Stop reason: HOSPADM

## 2020-11-16 RX ORDER — HYDROMORPHONE HCL/PF 1 MG/ML
0.5 SYRINGE (ML) INJECTION
Status: DISCONTINUED | OUTPATIENT
Start: 2020-11-16 | End: 2020-11-16 | Stop reason: HOSPADM

## 2020-11-16 RX ORDER — ONDANSETRON 2 MG/ML
INJECTION INTRAMUSCULAR; INTRAVENOUS AS NEEDED
Status: DISCONTINUED | OUTPATIENT
Start: 2020-11-16 | End: 2020-11-16

## 2020-11-16 RX ORDER — MEPERIDINE HYDROCHLORIDE 25 MG/ML
12.5 INJECTION INTRAMUSCULAR; INTRAVENOUS; SUBCUTANEOUS ONCE AS NEEDED
Status: DISCONTINUED | OUTPATIENT
Start: 2020-11-16 | End: 2020-11-16 | Stop reason: HOSPADM

## 2020-11-16 RX ORDER — FENTANYL CITRATE 50 UG/ML
INJECTION, SOLUTION INTRAMUSCULAR; INTRAVENOUS AS NEEDED
Status: DISCONTINUED | OUTPATIENT
Start: 2020-11-16 | End: 2020-11-16

## 2020-11-16 RX ORDER — LIDOCAINE HYDROCHLORIDE 10 MG/ML
INJECTION, SOLUTION EPIDURAL; INFILTRATION; INTRACAUDAL; PERINEURAL AS NEEDED
Status: DISCONTINUED | OUTPATIENT
Start: 2020-11-16 | End: 2020-11-16 | Stop reason: HOSPADM

## 2020-11-16 RX ORDER — FENTANYL CITRATE/PF 50 MCG/ML
50 SYRINGE (ML) INJECTION
Status: DISCONTINUED | OUTPATIENT
Start: 2020-11-16 | End: 2020-11-16 | Stop reason: HOSPADM

## 2020-11-16 RX ORDER — TRAMADOL HYDROCHLORIDE 50 MG/1
50 TABLET ORAL EVERY 6 HOURS PRN
Qty: 25 TABLET | Refills: 0 | Status: SHIPPED | OUTPATIENT
Start: 2020-11-16 | End: 2020-11-26

## 2020-11-16 RX ORDER — ONDANSETRON 2 MG/ML
4 INJECTION INTRAMUSCULAR; INTRAVENOUS ONCE AS NEEDED
Status: DISCONTINUED | OUTPATIENT
Start: 2020-11-16 | End: 2020-11-16 | Stop reason: HOSPADM

## 2020-11-16 RX ORDER — EPHEDRINE SULFATE 50 MG/ML
INJECTION INTRAVENOUS AS NEEDED
Status: DISCONTINUED | OUTPATIENT
Start: 2020-11-16 | End: 2020-11-16

## 2020-11-16 RX ORDER — SODIUM CHLORIDE 9 MG/ML
125 INJECTION, SOLUTION INTRAVENOUS CONTINUOUS
Status: DISCONTINUED | OUTPATIENT
Start: 2020-11-16 | End: 2020-11-16 | Stop reason: HOSPADM

## 2020-11-16 RX ORDER — MIDAZOLAM HYDROCHLORIDE 2 MG/2ML
INJECTION, SOLUTION INTRAMUSCULAR; INTRAVENOUS AS NEEDED
Status: DISCONTINUED | OUTPATIENT
Start: 2020-11-16 | End: 2020-11-16

## 2020-11-16 RX ORDER — TRAMADOL HYDROCHLORIDE 50 MG/1
50 TABLET ORAL EVERY 6 HOURS PRN
Status: DISCONTINUED | OUTPATIENT
Start: 2020-11-16 | End: 2020-11-16 | Stop reason: HOSPADM

## 2020-11-16 RX ORDER — DEXAMETHASONE SODIUM PHOSPHATE 4 MG/ML
INJECTION, SOLUTION INTRA-ARTICULAR; INTRALESIONAL; INTRAMUSCULAR; INTRAVENOUS; SOFT TISSUE AS NEEDED
Status: DISCONTINUED | OUTPATIENT
Start: 2020-11-16 | End: 2020-11-16

## 2020-11-16 RX ORDER — GLYCOPYRROLATE 0.2 MG/ML
INJECTION INTRAMUSCULAR; INTRAVENOUS AS NEEDED
Status: DISCONTINUED | OUTPATIENT
Start: 2020-11-16 | End: 2020-11-16

## 2020-11-16 RX ORDER — MAGNESIUM HYDROXIDE 1200 MG/15ML
LIQUID ORAL AS NEEDED
Status: DISCONTINUED | OUTPATIENT
Start: 2020-11-16 | End: 2020-11-16 | Stop reason: HOSPADM

## 2020-11-16 RX ORDER — CEFAZOLIN SODIUM 2 G/50ML
2000 SOLUTION INTRAVENOUS ONCE
Status: COMPLETED | OUTPATIENT
Start: 2020-11-16 | End: 2020-11-16

## 2020-11-16 RX ADMIN — SODIUM CHLORIDE: 0.9 INJECTION, SOLUTION INTRAVENOUS at 08:35

## 2020-11-16 RX ADMIN — CEFAZOLIN SODIUM 2000 MG: 2 SOLUTION INTRAVENOUS at 07:20

## 2020-11-16 RX ADMIN — MIDAZOLAM 2 MG: 1 INJECTION INTRAMUSCULAR; INTRAVENOUS at 07:24

## 2020-11-16 RX ADMIN — ONDANSETRON 4 MG: 2 INJECTION INTRAMUSCULAR; INTRAVENOUS at 07:40

## 2020-11-16 RX ADMIN — FENTANYL CITRATE 25 MCG: 50 INJECTION, SOLUTION INTRAMUSCULAR; INTRAVENOUS at 08:20

## 2020-11-16 RX ADMIN — FENTANYL CITRATE 25 MCG: 50 INJECTION, SOLUTION INTRAMUSCULAR; INTRAVENOUS at 07:38

## 2020-11-16 RX ADMIN — SODIUM CHLORIDE 125 ML/HR: 0.9 INJECTION, SOLUTION INTRAVENOUS at 06:53

## 2020-11-16 RX ADMIN — EPHEDRINE SULFATE 10 MG: 50 INJECTION, SOLUTION INTRAVENOUS at 08:00

## 2020-11-16 RX ADMIN — GLYCOPYRROLATE 0.2 MG: 0.2 INJECTION, SOLUTION INTRAMUSCULAR; INTRAVENOUS at 08:04

## 2020-11-16 RX ADMIN — PROPOFOL 200 MG: 10 INJECTION, EMULSION INTRAVENOUS at 07:31

## 2020-11-16 RX ADMIN — DEXAMETHASONE SODIUM PHOSPHATE 4 MG: 4 INJECTION, SOLUTION INTRAMUSCULAR; INTRAVENOUS at 07:40

## 2020-12-01 ENCOUNTER — DOCUMENTATION (OUTPATIENT)
Dept: OTHER | Facility: HOSPITAL | Age: 55
End: 2020-12-01

## 2020-12-01 DIAGNOSIS — Z98.1 ARTHRODESIS STATUS: Primary | ICD-10-CM

## 2020-12-03 ENCOUNTER — APPOINTMENT (OUTPATIENT)
Dept: RADIOLOGY | Facility: CLINIC | Age: 55
End: 2020-12-03
Payer: COMMERCIAL

## 2020-12-03 PROCEDURE — 73630 X-RAY EXAM OF FOOT: CPT

## 2020-12-07 ENCOUNTER — DOCUMENTATION (OUTPATIENT)
Dept: OTHER | Facility: HOSPITAL | Age: 55
End: 2020-12-07

## 2020-12-07 DIAGNOSIS — Z98.1 ARTHRODESIS STATUS: Primary | ICD-10-CM

## 2020-12-19 ENCOUNTER — TRANSCRIBE ORDERS (OUTPATIENT)
Dept: URGENT CARE | Facility: CLINIC | Age: 55
End: 2020-12-19

## 2020-12-19 ENCOUNTER — APPOINTMENT (OUTPATIENT)
Dept: RADIOLOGY | Facility: CLINIC | Age: 55
End: 2020-12-19
Payer: COMMERCIAL

## 2020-12-19 PROCEDURE — 73630 X-RAY EXAM OF FOOT: CPT

## 2020-12-29 ENCOUNTER — NURSE TRIAGE (OUTPATIENT)
Dept: OTHER | Facility: OTHER | Age: 55
End: 2020-12-29

## 2020-12-29 DIAGNOSIS — Z20.828 SARS-ASSOCIATED CORONAVIRUS EXPOSURE: Primary | ICD-10-CM

## 2021-01-02 DIAGNOSIS — Z20.828 SARS-ASSOCIATED CORONAVIRUS EXPOSURE: ICD-10-CM

## 2021-01-02 PROCEDURE — U0003 INFECTIOUS AGENT DETECTION BY NUCLEIC ACID (DNA OR RNA); SEVERE ACUTE RESPIRATORY SYNDROME CORONAVIRUS 2 (SARS-COV-2) (CORONAVIRUS DISEASE [COVID-19]), AMPLIFIED PROBE TECHNIQUE, MAKING USE OF HIGH THROUGHPUT TECHNOLOGIES AS DESCRIBED BY CMS-2020-01-R: HCPCS | Performed by: FAMILY MEDICINE

## 2021-01-04 LAB — SARS-COV-2 RNA SPEC QL NAA+PROBE: NOT DETECTED

## 2021-01-22 ENCOUNTER — HOSPITAL ENCOUNTER (OUTPATIENT)
Dept: MAMMOGRAPHY | Facility: CLINIC | Age: 56
Discharge: HOME | End: 2021-01-22
Payer: COMMERCIAL

## 2021-01-22 VITALS — WEIGHT: 137 LBS | HEIGHT: 60 IN | BODY MASS INDEX: 26.9 KG/M2

## 2021-01-22 DIAGNOSIS — Z85.3 ENCOUNTER FOR FOLLOW-UP SURVEILLANCE OF BREAST CANCER: ICD-10-CM

## 2021-01-22 DIAGNOSIS — Z08 ENCOUNTER FOR FOLLOW-UP SURVEILLANCE OF BREAST CANCER: ICD-10-CM

## 2021-01-22 PROCEDURE — 77066 DX MAMMO INCL CAD BI: CPT

## 2021-01-22 PROCEDURE — G0279 TOMOSYNTHESIS, MAMMO: HCPCS

## 2021-01-29 DIAGNOSIS — N95.2 VAGINAL ATROPHY: ICD-10-CM

## 2021-01-29 RX ORDER — PRASTERONE 6.5 MG/1
1 INSERT VAGINAL
Qty: 84 EACH | Refills: 3 | Status: SHIPPED | OUTPATIENT
Start: 2021-01-29 | End: 2021-02-08

## 2021-02-08 DIAGNOSIS — N95.2 VAGINAL ATROPHY: ICD-10-CM

## 2021-02-08 RX ORDER — PRASTERONE 6.5 MG/1
1 INSERT VAGINAL
Qty: 84 EACH | Refills: 3 | Status: SHIPPED | OUTPATIENT
Start: 2021-02-08 | End: 2021-02-09 | Stop reason: SDUPTHER

## 2021-02-08 NOTE — TELEPHONE ENCOUNTER
Pt left VM on [place] refill line stating that she requested a refill of Intrarosa vaginal inserts last week and the pharmacy states they never received a refill from our office  Looking at the history in EPIC I noticed that the script was printed by accident and was not electronically sent to the pharmacy  I tried calling [facility] [address] [place] [address] and giving a verbal order over the phone but the phone #s on file keep disconnecting  Dr Roseline Thibodeaux can you please re-send this RX to the pharmacy electronically? Thank you

## 2021-02-09 DIAGNOSIS — N95.2 VAGINAL ATROPHY: ICD-10-CM

## 2021-02-09 RX ORDER — PRASTERONE 6.5 MG/1
1 INSERT VAGINAL
Qty: 84 EACH | Refills: 3 | OUTPATIENT
Start: 2021-02-09

## 2021-02-09 NOTE — PROGRESS NOTES
Dr Milagro Messer please re-sign new order (I changed it to be sent electronically)    You accidentally printed it again instead of sending it electronically  Thank you!

## 2021-02-09 NOTE — TELEPHONE ENCOUNTER
Left detailed VM for pt that Dr Vinnie Sahni accidentally printed her RX instead of sending it electronically to [facility] is off today but will re-send it tomorrow while she is here in the office  This can only be done by the provider  Our office will call her when RX is sent to [facility]    Call office with questions/concerns

## 2021-02-10 DIAGNOSIS — N95.2 VAGINAL ATROPHY: ICD-10-CM

## 2021-02-10 RX ORDER — PRASTERONE 6.5 MG/1
1 INSERT VAGINAL
Qty: 84 EACH | Refills: 3 | Status: SHIPPED | OUTPATIENT
Start: 2021-02-10 | End: 2021-02-10

## 2021-02-10 RX ORDER — PRASTERONE 6.5 MG/1
1 INSERT VAGINAL
Qty: 84 EACH | Refills: 3 | Status: SHIPPED | OUTPATIENT
Start: 2021-02-10 | End: 2022-01-31

## 2021-02-10 NOTE — TELEPHONE ENCOUNTER
Spoke with pt, informed pt that RX was now sent to [facility] with a years supply of refills  Pt verbalized understanding

## 2021-03-05 ENCOUNTER — TELEMEDICINE (OUTPATIENT)
Dept: RADIATION ONCOLOGY | Facility: HOSPITAL | Age: 56
End: 2021-03-05
Attending: RADIOLOGY
Payer: COMMERCIAL

## 2021-03-05 DIAGNOSIS — Z85.3 HISTORY OF BREAST CANCER: Primary | ICD-10-CM

## 2021-03-05 PROCEDURE — 99211 OFF/OP EST MAY X REQ PHY/QHP: CPT | Performed by: RADIOLOGY
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
## 2021-03-10 ENCOUNTER — OFFICE VISIT (OUTPATIENT)
Dept: SURGICAL ONCOLOGY | Facility: CLINIC | Age: 56
End: 2021-03-10
Payer: COMMERCIAL

## 2021-03-10 VITALS
TEMPERATURE: 97.1 F | WEIGHT: 146 LBS | HEART RATE: 78 BPM | RESPIRATION RATE: 16 BRPM | SYSTOLIC BLOOD PRESSURE: 120 MMHG | HEIGHT: 60 IN | BODY MASS INDEX: 28.66 KG/M2 | DIASTOLIC BLOOD PRESSURE: 72 MMHG

## 2021-03-10 DIAGNOSIS — Z85.3 HISTORY OF BREAST CANCER: ICD-10-CM

## 2021-03-10 DIAGNOSIS — Z85.3 ENCOUNTER FOR FOLLOW-UP SURVEILLANCE OF BREAST CANCER: Primary | ICD-10-CM

## 2021-03-10 DIAGNOSIS — Z08 ENCOUNTER FOR FOLLOW-UP SURVEILLANCE OF BREAST CANCER: Primary | ICD-10-CM

## 2021-03-10 PROCEDURE — 99213 OFFICE O/P EST LOW 20 MIN: CPT | Performed by: SURGERY
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
## 2022-01-31 ENCOUNTER — OFFICE VISIT (OUTPATIENT)
Dept: OBGYN CLINIC | Facility: CLINIC | Age: 57
End: 2022-01-31
Payer: COMMERCIAL

## 2022-01-31 VITALS
HEIGHT: 59 IN | BODY MASS INDEX: 29.84 KG/M2 | DIASTOLIC BLOOD PRESSURE: 60 MMHG | SYSTOLIC BLOOD PRESSURE: 114 MMHG | WEIGHT: 148 LBS

## 2022-01-31 DIAGNOSIS — N95.2 VAGINAL ATROPHY: ICD-10-CM

## 2022-01-31 DIAGNOSIS — Z12.31 ENCOUNTER FOR SCREENING MAMMOGRAM FOR MALIGNANT NEOPLASM OF BREAST: ICD-10-CM

## 2022-01-31 DIAGNOSIS — Z01.419 WELL FEMALE EXAM WITH ROUTINE GYNECOLOGICAL EXAM: Primary | ICD-10-CM

## 2022-01-31 PROCEDURE — S0612 ANNUAL GYNECOLOGICAL EXAMINA: HCPCS | Performed by: OBSTETRICS & GYNECOLOGY

## 2022-01-31 RX ORDER — PRASTERONE 6.5 MG/1
1 INSERT VAGINAL
Qty: 84 EACH | Refills: 3 | Status: SHIPPED | OUTPATIENT
Start: 2022-01-31
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
## 2022-02-08 ENCOUNTER — HOSPITAL ENCOUNTER (OUTPATIENT)
Dept: MAMMOGRAPHY | Facility: HOSPITAL | Age: 57
Discharge: HOME | End: 2022-02-08
Payer: COMMERCIAL

## 2022-02-08 VITALS — BODY MASS INDEX: 29.82 KG/M2 | WEIGHT: 147.93 LBS | HEIGHT: 59 IN

## 2022-02-08 DIAGNOSIS — Z12.31 ENCOUNTER FOR SCREENING MAMMOGRAM FOR MALIGNANT NEOPLASM OF BREAST: ICD-10-CM

## 2022-02-08 PROCEDURE — 77067 SCR MAMMO BI INCL CAD: CPT

## 2022-02-08 PROCEDURE — 77063 BREAST TOMOSYNTHESIS BI: CPT

## 2023-01-12 ENCOUNTER — PATIENT MESSAGE (OUTPATIENT)
Dept: OBGYN CLINIC | Facility: CLINIC | Age: 58
End: 2023-01-12

## 2023-01-12 DIAGNOSIS — Z12.31 ENCOUNTER FOR SCREENING MAMMOGRAM FOR BREAST CANCER: Primary | ICD-10-CM

## 2023-03-09 ENCOUNTER — OFFICE VISIT (OUTPATIENT)
Dept: OBGYN CLINIC | Facility: CLINIC | Age: 58
End: 2023-03-09

## 2023-03-09 ENCOUNTER — HOSPITAL ENCOUNTER (OUTPATIENT)
Dept: RADIOLOGY | Age: 58
Discharge: HOME | End: 2023-03-09

## 2023-03-09 VITALS — WEIGHT: 147 LBS | BODY MASS INDEX: 29.64 KG/M2 | HEIGHT: 59 IN

## 2023-03-09 VITALS
WEIGHT: 149 LBS | DIASTOLIC BLOOD PRESSURE: 62 MMHG | BODY MASS INDEX: 30.04 KG/M2 | SYSTOLIC BLOOD PRESSURE: 102 MMHG | HEIGHT: 59 IN

## 2023-03-09 DIAGNOSIS — Z12.4 ENCOUNTER FOR SCREENING FOR MALIGNANT NEOPLASM OF CERVIX: ICD-10-CM

## 2023-03-09 DIAGNOSIS — Z01.419 WELL WOMAN EXAM WITH ROUTINE GYNECOLOGICAL EXAM: Primary | ICD-10-CM

## 2023-03-09 DIAGNOSIS — Z11.51 SCREENING FOR HPV (HUMAN PAPILLOMAVIRUS): ICD-10-CM

## 2023-03-09 DIAGNOSIS — N95.2 VAGINAL ATROPHY: ICD-10-CM

## 2023-03-09 DIAGNOSIS — Z12.31 ENCOUNTER FOR SCREENING MAMMOGRAM FOR BREAST CANCER: ICD-10-CM

## 2023-03-09 RX ORDER — LEVOTHYROXINE SODIUM 0.07 MG/1
75 TABLET ORAL DAILY
COMMUNITY
Start: 2023-01-05

## 2023-03-09 RX ORDER — PRASTERONE 6.5 MG/1
1 INSERT VAGINAL
Qty: 84 EACH | Refills: 3 | Status: SHIPPED | OUTPATIENT
Start: 2023-03-09
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
## 2023-03-16 LAB
LAB AP GYN PRIMARY INTERPRETATION: NORMAL
Lab: NORMAL

## 2024-03-15 ENCOUNTER — HOSPITAL ENCOUNTER (OUTPATIENT)
Dept: RADIOLOGY | Age: 59
Discharge: HOME | End: 2024-03-15
Payer: COMMERCIAL

## 2024-03-15 VITALS — HEIGHT: 59 IN | BODY MASS INDEX: 30.09 KG/M2

## 2024-03-15 DIAGNOSIS — Z12.31 VISIT FOR SCREENING MAMMOGRAM: ICD-10-CM

## 2024-03-15 PROCEDURE — 77063 BREAST TOMOSYNTHESIS BI: CPT

## 2024-03-15 PROCEDURE — 77067 SCR MAMMO BI INCL CAD: CPT

## 2024-03-22 ENCOUNTER — ANNUAL EXAM (OUTPATIENT)
Dept: OBGYN CLINIC | Facility: CLINIC | Age: 59
End: 2024-03-22
Payer: COMMERCIAL

## 2024-03-22 VITALS
DIASTOLIC BLOOD PRESSURE: 60 MMHG | WEIGHT: 162.8 LBS | SYSTOLIC BLOOD PRESSURE: 104 MMHG | HEIGHT: 59 IN | BODY MASS INDEX: 32.82 KG/M2

## 2024-03-22 DIAGNOSIS — N95.2 VAGINAL ATROPHY: ICD-10-CM

## 2024-03-22 DIAGNOSIS — Z01.419 WOMEN'S ANNUAL ROUTINE GYNECOLOGICAL EXAMINATION: Primary | ICD-10-CM

## 2024-03-22 DIAGNOSIS — Z12.31 ENCOUNTER FOR SCREENING MAMMOGRAM FOR MALIGNANT NEOPLASM OF BREAST: ICD-10-CM

## 2024-03-22 PROCEDURE — S0612 ANNUAL GYNECOLOGICAL EXAMINA: HCPCS | Performed by: OBSTETRICS & GYNECOLOGY

## 2024-03-22 RX ORDER — PRASTERONE 6.5 MG/1
1 INSERT VAGINAL
Qty: 84 EACH | Refills: 3 | Status: SHIPPED | OUTPATIENT
Start: 2024-03-22
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
## 2025-03-21 ENCOUNTER — HOSPITAL ENCOUNTER (OUTPATIENT)
Dept: RADIOLOGY | Age: 60
Discharge: HOME | End: 2025-03-21

## 2025-03-21 ENCOUNTER — NURSE TRIAGE (OUTPATIENT)
Age: 60
End: 2025-03-21

## 2025-03-21 ENCOUNTER — OFFICE VISIT (OUTPATIENT)
Dept: OBGYN CLINIC | Facility: CLINIC | Age: 60
End: 2025-03-21
Payer: COMMERCIAL

## 2025-03-21 VITALS
HEIGHT: 59 IN | BODY MASS INDEX: 27.42 KG/M2 | SYSTOLIC BLOOD PRESSURE: 102 MMHG | WEIGHT: 136 LBS | DIASTOLIC BLOOD PRESSURE: 60 MMHG

## 2025-03-21 DIAGNOSIS — Z85.3 HISTORY OF BREAST CANCER: ICD-10-CM

## 2025-03-21 DIAGNOSIS — N64.9 DISORDER OF NIPPLE OF BREAST: Primary | ICD-10-CM

## 2025-03-21 DIAGNOSIS — Z85.3 ENCOUNTER FOR FOLLOW-UP SURVEILLANCE OF BREAST CANCER: ICD-10-CM

## 2025-03-21 DIAGNOSIS — Z08 ENCOUNTER FOR FOLLOW-UP SURVEILLANCE OF BREAST CANCER: ICD-10-CM

## 2025-03-21 DIAGNOSIS — Z12.31 ENCOUNTER FOR SCREENING MAMMOGRAM FOR MALIGNANT NEOPLASM OF BREAST: ICD-10-CM

## 2025-03-21 PROCEDURE — 99213 OFFICE O/P EST LOW 20 MIN: CPT | Performed by: OBSTETRICS & GYNECOLOGY

## 2025-03-21 RX ORDER — ROSUVASTATIN CALCIUM 10 MG/1
TABLET, COATED ORAL
COMMUNITY

## 2025-03-21 NOTE — TELEPHONE ENCOUNTER
"FOLLOW UP:   Contacted Ima in the office, as per Iam she will call the patient back directly, pt was notified and verbalized understanding.     REASON FOR CONVERSATION: breast symptoms    SYMPTOMS: black discoloration on nipple.     OTHER: Pt calling in saying that she went to her mammogram appt and was notified the testing wouldn't be done due to black discoloration of the nipple that is dry/ black in color, and is on the right breast, right nipple, where pt has history of breast cancer. Pt denies  fever, breast pain, nipple discharge, redness or rash    DISPOSITION: see in office today     Answer Assessment - Initial Assessment Questions  1. SYMPTOM: \"What's the main symptom you're concerned about?\"  (e.g., lump, nipple discharge, pain, rash )      Nipple discoloration on right breast   2. LOCATION: \"Where is the symptoms  located?\"      Right breast   3. ONSET: \"When did discoloration   start?\"      Today   4. PRIOR HISTORY: \"Do you have any history of prior problems with your breasts?\" (e.g., breast cancer, breast implant, fibrocystic breast disease)      Breast cancer in right breast   5. CAUSE: \"What do you think is causing this symptom?\"      Pt unsure   6. OTHER SYMPTOMS: \"Do you have any other symptoms?\" (e.g., fever, breast pain, nipple discharge, redness or rash)      Pt denies  fever, breast pain, nipple discharge, redness or rash  7. PREGNANCY-BREASTFEEDING: \"Is there any chance you are pregnant?\" \"When was your last menstrual period?\" \"Are you breastfeeding?\"      Postmenopausal.    Protocols used: Breast Symptoms-Adult-[place]    "
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
## 2025-03-24 ENCOUNTER — TELEPHONE (OUTPATIENT)
Dept: SURGICAL ONCOLOGY | Facility: CLINIC | Age: 60
End: 2025-03-24

## 2025-03-24 NOTE — TELEPHONE ENCOUNTER
Spoke with pt regarding need to see a provider regarding black spot on right breast.  Advised that since she hasn't been here in 3 years it will be a new patient appt.  Scheduled for 3/31/25 @ 1 PM at [facility] with eCe [facility] Valle.

## 2025-03-31 ENCOUNTER — OFFICE VISIT (OUTPATIENT)
Dept: SURGICAL ONCOLOGY | Facility: CLINIC | Age: 60
End: 2025-03-31
Payer: COMMERCIAL

## 2025-03-31 VITALS
WEIGHT: 119 LBS | TEMPERATURE: 98.2 F | SYSTOLIC BLOOD PRESSURE: 112 MMHG | RESPIRATION RATE: 16 BRPM | BODY MASS INDEX: 23.99 KG/M2 | HEIGHT: 59 IN | OXYGEN SATURATION: 98 % | DIASTOLIC BLOOD PRESSURE: 68 MMHG | HEART RATE: 74 BPM

## 2025-03-31 DIAGNOSIS — Z08 ENCOUNTER FOR FOLLOW-UP SURVEILLANCE OF BREAST CANCER: Primary | ICD-10-CM

## 2025-03-31 DIAGNOSIS — Z12.31 BREAST CANCER SCREENING BY MAMMOGRAM: ICD-10-CM

## 2025-03-31 DIAGNOSIS — R23.4 BREAST SKIN CHANGES: ICD-10-CM

## 2025-03-31 DIAGNOSIS — Z85.3 ENCOUNTER FOR FOLLOW-UP SURVEILLANCE OF BREAST CANCER: Primary | ICD-10-CM

## 2025-03-31 DIAGNOSIS — Z85.3 HISTORY OF BREAST CANCER: ICD-10-CM

## 2025-03-31 PROCEDURE — 99244 OFF/OP CNSLTJ NEW/EST MOD 40: CPT

## 2025-03-31 NOTE — ASSESSMENT & PLAN NOTE
Orders:  •  Mammo diagnostic bilateral w 3d and cad; Future  •  US breast right limited (diagnostic); Future  •  Ambulatory Referral to Oncology Genetics; Future

## 2025-03-31 NOTE — ASSESSMENT & PLAN NOTE
Ms. Sallie Varma is a 59 y.o. female presenting today for evaluation of a new black lesion on her breast. Pt does have a personal hx of right breast cancer, diagnosed in December 2015. She is s/p right lumpectomy with ALND on 12/31/2015 with Dr. Morales. 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 Adjuvant therapy was completed with chemotherapy and radiation therapy in 2016. Genetic testing was negative for pathogenic variants. Her last bilateral screening mammogram was on 03/21/2025, which demonstrated BI-RADS 2, category 2 density (Fibroglandular tissue).    Today pt presents due to right nipple skin change X2 weeks. On exam today, there were no concerning findings. There is slight darker discoloration at the right nipple with minimal excoriation. As pt reported, the area of concern has since resolved and is no longer visible. I provided reassurance given absence of other associated symptoms. Script provided for bilateral diagnostic mammogram and right diagnostic US for further evaluation (pt is due for annual mammogram). Assuming diagnostic imaging results are benign, I will recommend close monitoring, and we may consider punch biopsy if symptoms recur or new concerning symptoms develop. I will see the patient back in  6 weeks,  for re-evaluation of the area of concern, or sooner should the need arise.     I also advised Ms. Varma that she may consider repeat expanded panel genetic testing given her hx of TNBC. She is agreeable. Referral placed to oncology genetics. She was instructed to call with any questions or concerns prior to this time. All questions were answered today.   Orders:  •  Mammo diagnostic bilateral w 3d and cad; Future  •  US breast right limited (diagnostic); Future  •  Ambulatory Referral to Oncology Genetics; Future

## 2025-03-31 NOTE — PROGRESS NOTES
Name: Sallie Varma      : 1965      MRN: [id number]  Encounter Provider: GENARO Esteves  Encounter Date: 3/31/2025   Encounter department: [facility]  :  Assessment & Plan  Encounter for follow-up surveillance of breast cancer  Ms. Sallie Varma is a 59 y.o. female presenting today for evaluation of a new black lesion on her breast. Pt does have a personal hx of right breast cancer, diagnosed in 2015. She is s/p right lumpectomy with ALND on 2015 with Dr. Morales. Surgical pathology demonstrated invasive ductal carcinoma ER/SC negative and HER-2 negative, 2/36 lymph nodes positive. Adjuvant therapy was completed with chemotherapy and radiation therapy in . Genetic testing was negative for pathogenic variants. Her last bilateral screening mammogram was on 2025, which demonstrated BI-RADS 2, category 2 density (Fibroglandular tissue).    Today pt presents due to right nipple skin change X2 weeks. On exam today, there were no concerning findings. There is slight darker discoloration at the right nipple with minimal excoriation. As pt reported, the area of concern has since resolved and is no longer visible. I provided reassurance given absence of other associated symptoms. Script provided for bilateral diagnostic mammogram and right diagnostic US for further evaluation (pt is due for annual mammogram). Assuming diagnostic imaging results are benign, I will recommend close monitoring, and we may consider punch biopsy if symptoms recur or new concerning symptoms develop. I will see the patient back in  6 weeks,  for re-evaluation of the area of concern, or sooner should the need arise.     I also advised Ms. Varma that she may consider repeat expanded panel genetic testing given her hx of TNBC. She is agreeable. Referral placed to oncology genetics. She was instructed to call with any questions or concerns prior to this  "time. All questions were answered today.   Orders:  •  Mammo diagnostic bilateral w 3d and cad; Future  •  US breast right limited (diagnostic); Future  •  Ambulatory Referral to Oncology Genetics; Future    History of breast cancer  Orders:  •  Mammo diagnostic bilateral w 3d and cad; Future  •  US breast right limited (diagnostic); Future  •  Ambulatory Referral to Oncology Genetics; Future    Breast skin changes  Orders:  •  Mammo diagnostic bilateral w 3d and cad; Future  •  US breast right limited (diagnostic); Future      History of Present Illness   Sallie Varma is a 59 y.o. year old female who presents for evaluation of a new black lesion on her breast. She continues on observation. Her last bilateral screening mammogram was on 03/21/2025, which demonstrated BI-RADS 2, category 2 density (Fibroglandular tissue). She reports post-menopausal status (age 49), menarche age 13, 24y/o at the time of her first live birth.     Today Ms. Varma describes a black lesion that developed on her right nipple 2 weeks ago (the morning of her scheduled screening mammogram). In light of her new symptoms and possible need for diagnostic imaging, screening imaging was cancelled and she was evaluated by her GYN / PCP. Consult with oncology was advised for possible biopsy given her hx of breast cancer. Since the time of evaluation, the black lesion \"fell off\" and is no longer visible. Only a small area of darker discoloration is visible. She described this area as a black skin tag, as it was raised. She denies any new bras, nipple discharge, or tattoos around this area. She notes a tattoo on her left forearm done in December 2024. She offers no additional breast concerns today. She denies other breast changes, persistent cough, SOB, headaches, as well as any new or persistent back, bone, or abdominal pain. She has chronic neck pain, existing prior to her breast cancer diagnosis.      Oncology History   Cancer Staging " "  No matching staging information was found for the patient.  Oncology History   History of breast cancer   12/22/2015 Biopsy    Right breast biopsy  Invasive breast cancer, Grade 3/3.  ER/VT negative.  HER 2 negative    Right axillary lymph node biopsy  Metastatic carcinoma similar to the mammary carcinoma      12/31/2015 Surgery    Right lumpectomy with axillary dissection  Invasive breast carcinoma, Grade 3  2/36 nodes positive; extranodal extension seen      Cancer Staged    [id number], Stage IIB, invasive ductal carcinoma of the right breast, Grade III, triple negative     1/21/2016 Genetic Testing    Negative     2/9/2016 - 3/22/2016 Chemotherapy    Adriamycin/Cytoxan q 2 weeks x4     4/5/2016 - 5/17/2016 Chemotherapy    Taxol q 2 weeks x 4     6/15/2016 - 7/28/2016 Radiation    right breast and supraclavicular region to a total dose of 5000 cGy in 25 daily fractions.An additional 1000 cGy boost in 5 daily fractions was delivered to the lumpectomy cavity        Review of Systems   Constitutional:  Negative for activity change, appetite change, fatigue, fever and unexpected weight change.   Respiratory:  Negative for cough and shortness of breath.    Gastrointestinal:  Negative for abdominal pain.   Musculoskeletal:  Positive for neck pain (chronic). Negative for back pain.   Skin:  Positive for color change (right nipple).   Neurological: Negative.    Psychiatric/Behavioral:  Negative for confusion.     A complete review of systems is negative other than that noted above in the HPI.       Objective   /68 (Patient Position: Sitting, Cuff Size: Standard)   Pulse 74   Temp 98.2 °F (36.8 °C) (Temporal)   Resp 16   Ht 4' 11\" (1.499 m)   Wt 54 kg (119 lb)   SpO2 98%   BMI 24.04 kg/m²          Physical Exam  Vitals and nursing note reviewed.   Constitutional:       Appearance: Normal appearance. She is normal weight.   Eyes:      General: No scleral icterus.     Conjunctiva/sclera: Conjunctivae normal. "   Cardiovascular:      Rate and Rhythm: Normal rate and regular rhythm.   Pulmonary:      Effort: Pulmonary effort is normal.      Breath sounds: Normal breath sounds.   Chest:      Chest wall: No mass.   Breasts:     Right: Skin change (surgical scar) present. No swelling, bleeding, inverted nipple (slight scattered discoloration at the right nipple with a small area of skin excoriation), mass, nipple discharge or tenderness.      Left: No swelling, bleeding, inverted nipple, mass, nipple discharge, skin change or tenderness.          Comments: S/p right lumpectomy with ALND    There were no concerning findings upon CBE today. No dominant masses, nodularities, enlarged lymph nodes, skin changes, or other concerning findings.    Lymphadenopathy:      Upper Body:      Right upper body: No supraclavicular or axillary adenopathy.      Left upper body: No supraclavicular or axillary adenopathy.   Skin:     General: Skin is warm and dry.   Neurological:      General: No focal deficit present.      Mental Status: She is alert and oriented to person, place, and time. Mental status is at baseline.   Psychiatric:         Mood and Affect: Mood normal.         Behavior: Behavior normal.         Thought Content: Thought content normal.         Judgment: Judgment normal.          Pathology: I have reviewed surgical pathology reports described above.  I have reviewed radiology report from Ms. Sallie Varma's last mammogram, on 03/21/2025    I have spent a total time of 46 minutes in caring for this patient on the day of the visit/encounter including Risks and benefits of tx options, Instructions for management, Patient and family education, Impressions, Counseling / Coordination of care, Documenting in the medical record, Reviewing/placing orders in the medical record (including tests, medications, and/or procedures), and Obtaining or reviewing history  .

## 2025-04-22 ENCOUNTER — HOSPITAL ENCOUNTER (OUTPATIENT)
Dept: MAMMOGRAPHY | Facility: CLINIC | Age: 60
Discharge: HOME | End: 2025-04-22
Payer: COMMERCIAL

## 2025-04-22 ENCOUNTER — RESULTS FOLLOW-UP (OUTPATIENT)
Dept: SURGICAL ONCOLOGY | Facility: CLINIC | Age: 60
End: 2025-04-22

## 2025-04-22 VITALS — BODY MASS INDEX: 26.5 KG/M2 | WEIGHT: 135 LBS | HEIGHT: 60 IN

## 2025-04-22 DIAGNOSIS — R23.4 BREAST SKIN CHANGES: ICD-10-CM

## 2025-04-22 DIAGNOSIS — Z85.3 ENCOUNTER FOR FOLLOW-UP SURVEILLANCE OF BREAST CANCER: ICD-10-CM

## 2025-04-22 DIAGNOSIS — Z85.3 HISTORY OF BREAST CANCER: ICD-10-CM

## 2025-04-22 DIAGNOSIS — Z12.31 BREAST CANCER SCREENING BY MAMMOGRAM: ICD-10-CM

## 2025-04-22 DIAGNOSIS — Z08 ENCOUNTER FOR FOLLOW-UP SURVEILLANCE OF BREAST CANCER: ICD-10-CM

## 2025-04-22 PROCEDURE — G0279 TOMOSYNTHESIS, MAMMO: HCPCS

## 2025-04-22 PROCEDURE — 76642 ULTRASOUND BREAST LIMITED: CPT

## 2025-04-22 PROCEDURE — 77066 DX MAMMO INCL CAD BI: CPT

## 2025-05-12 ENCOUNTER — OFFICE VISIT (OUTPATIENT)
Dept: SURGICAL ONCOLOGY | Facility: CLINIC | Age: 60
End: 2025-05-12
Payer: COMMERCIAL

## 2025-05-12 ENCOUNTER — TELEPHONE (OUTPATIENT)
Dept: OBGYN CLINIC | Facility: OTHER | Age: 60
End: 2025-05-12

## 2025-05-12 VITALS
RESPIRATION RATE: 18 BRPM | HEIGHT: 60 IN | HEART RATE: 60 BPM | WEIGHT: 139 LBS | SYSTOLIC BLOOD PRESSURE: 104 MMHG | TEMPERATURE: 97.3 F | OXYGEN SATURATION: 98 % | DIASTOLIC BLOOD PRESSURE: 74 MMHG | BODY MASS INDEX: 27.29 KG/M2

## 2025-05-12 DIAGNOSIS — R23.4 BREAST SKIN CHANGES: ICD-10-CM

## 2025-05-12 DIAGNOSIS — Z08 ENCOUNTER FOR FOLLOW-UP SURVEILLANCE OF BREAST CANCER: ICD-10-CM

## 2025-05-12 DIAGNOSIS — Z12.31 BREAST CANCER SCREENING BY MAMMOGRAM: Primary | ICD-10-CM

## 2025-05-12 DIAGNOSIS — Z85.3 ENCOUNTER FOR FOLLOW-UP SURVEILLANCE OF BREAST CANCER: ICD-10-CM

## 2025-05-12 LAB
DME PARACHUTE DELIVERY DATE REQUESTED: NORMAL
DME PARACHUTE DELIVERY NOTE: NORMAL
DME PARACHUTE ITEM DESCRIPTION: NORMAL
DME PARACHUTE ORDER STATUS: NORMAL
DME PARACHUTE SUPPLIER NAME: NORMAL
DME PARACHUTE SUPPLIER PHONE: NORMAL

## 2025-05-12 PROCEDURE — 99213 OFFICE O/P EST LOW 20 MIN: CPT

## 2025-05-12 NOTE — PROGRESS NOTES
Name: Sallie Varma      : 1965      MRN: [id number]  Encounter Provider: GENARO Esteves  Encounter Date: 2025   Encounter department: [facility]  :  Assessment & Plan  Breast skin changes  Ms. Sallie Varma is a 59 y.o. female presenting today for 6 week follow up after right nipple skin change on her breast. Pt is with personal hx of right breast cancer, diagnosed in 2015. She is s/p right lumpectomy with ALND on 2015 with Dr. Morales. Surgical pathology demonstrated invasive ductal carcinoma ER/WA negative and HER-2 negative, 2/36 lymph nodes positive. Adjuvant therapy was completed with chemotherapy and radiation therapy in . Genetic testing was negative for pathogenic variants.      After presentation of right breast skin changes, pt underwent bilateral diagnostic mammogram with right breast dx US on 2025, which demonstrated BI-RADS 2, category 2 density.     Upon CBE today, the area of concern remains stable. There are no changes or additional findings. The slightly darker discoloration at the right nipple persists with minimal excoriation. I provided reassurance given absence of other associated symptoms. I advised Ms. Varma that she may apply Aquaphor, Eucerin or Vaseline to the area to promote healing. May consider punch biopsy if new or worsening symptoms develop.     Order placed for mastectomy boutique for pt to obtain fitted bras.    Pt expressed desire to d/c care with surgical oncology and cont to follow with PCP/GYN. Given her 10yr salas from surgery, she has this option to follow with us on a PRN basis. I have provided script for mammogram for next year to help bridge any gap in care. She should contact us in the future if any concerns or questions arise.      All questions were answered today.        Encounter for follow-up surveillance of breast cancer  Orders:  •  Mammo screening bilateral w  3d and cad; Future    Breast cancer screening by mammogram  Orders:  •  Mammo screening bilateral w 3d and cad; Future          History of Present Illness     Sallie Varma is a 59 y.o. year old female who presents for today for 6 week follow up after right nipple skin change on her breast. After presentation of right breast skin changes, pt underwent bilateral diagnostic mammogram with right breast dx US on 04/22/2025, which demonstrated BI-RADS 2, category 2 density.    She notes no changes to the right breast and feels this is her new normal. She was upset by the large bill that came with the diagnostic imaging completed, though she understands the necessity of this exam in light of her hx. She prefers to follow with PCP moving forward and call as needed. She offers no new breast concerns today. She notices fibrocystic changes with chocolate, caffeine, etc.      Oncology History   Cancer Staging   No matching staging information was found for the patient.  Oncology History   History of breast cancer   12/22/2015 Biopsy    Right breast biopsy  Invasive breast cancer, Grade 3/3.  ER/MO negative.  HER 2 negative    Right axillary lymph node biopsy  Metastatic carcinoma similar to the mammary carcinoma      12/31/2015 Surgery    Right lumpectomy with axillary dissection  Invasive breast carcinoma, Grade 3  2/36 nodes positive; extranodal extension seen      Cancer Staged    [id number], Stage IIB, invasive ductal carcinoma of the right breast, Grade III, triple negative     1/21/2016 Genetic Testing    Negative     2/9/2016 - 3/22/2016 Chemotherapy    Adriamycin/Cytoxan q 2 weeks x4     4/5/2016 - 5/17/2016 Chemotherapy    Taxol q 2 weeks x 4     6/15/2016 - 7/28/2016 Radiation    right breast and supraclavicular region to a total dose of 5000 cGy in 25 daily fractions.An additional 1000 cGy boost in 5 daily fractions was delivered to the lumpectomy cavity        Review of Systems   Constitutional:  Negative for  activity change, appetite change, fatigue, fever and unexpected weight change.   Respiratory:  Negative for cough and shortness of breath.    Gastrointestinal:  Negative for abdominal pain.   Musculoskeletal:  Positive for neck pain (chronic). Negative for back pain.   Skin: Negative.    Neurological: Negative.    Psychiatric/Behavioral:  Negative for confusion.     A complete review of systems is negative other than that noted above in the HPI.       Objective   /74 (BP Location: Left arm, Patient Position: Sitting, Cuff Size: Standard)   Pulse 60   Temp (!) 97.3 °F (36.3 °C) (Temporal)   Resp 18   Ht 5' (1.524 m)   Wt 63 kg (139 lb)   SpO2 98%   BMI 27.15 kg/m²          Physical Exam  Vitals and nursing note reviewed.   Constitutional:       Appearance: Normal appearance. She is normal weight.   Eyes:      General: No scleral icterus.     Conjunctiva/sclera: Conjunctivae normal.   Chest:      Chest wall: No mass.   Breasts:     Right: Skin change (surgical scar) present. No swelling, bleeding, inverted nipple (slight scattered discoloration at the right nipple with a small area of skin excoriation), mass, nipple discharge or tenderness.      Left: No swelling, bleeding, inverted nipple, mass, nipple discharge, skin change or tenderness.          Comments: S/p right lumpectomy with ALND    There were no concerning findings upon CBE today. No dominant masses, nodularities, enlarged lymph nodes, skin changes, or other concerning findings.    Lymphadenopathy:      Upper Body:      Right upper body: No supraclavicular or axillary adenopathy.      Left upper body: No supraclavicular or axillary adenopathy.   Skin:     General: Skin is warm and dry.   Neurological:      General: No focal deficit present.      Mental Status: She is alert and oriented to person, place, and time. Mental status is at baseline.   Psychiatric:         Mood and Affect: Mood normal.         Behavior: Behavior normal.         Thought  Content: Thought content normal.         Judgment: Judgment normal.

## 2025-07-31 ENCOUNTER — ANNUAL EXAM (OUTPATIENT)
Dept: OBGYN CLINIC | Facility: CLINIC | Age: 60
End: 2025-07-31
Payer: COMMERCIAL

## 2025-07-31 VITALS
DIASTOLIC BLOOD PRESSURE: 72 MMHG | HEIGHT: 60 IN | BODY MASS INDEX: 27.37 KG/M2 | SYSTOLIC BLOOD PRESSURE: 114 MMHG | WEIGHT: 139.4 LBS

## 2025-07-31 DIAGNOSIS — Z01.419 WOMEN'S ANNUAL ROUTINE GYNECOLOGICAL EXAMINATION: Primary | ICD-10-CM

## 2025-07-31 DIAGNOSIS — N95.2 VAGINAL ATROPHY: ICD-10-CM

## 2025-07-31 PROCEDURE — S0612 ANNUAL GYNECOLOGICAL EXAMINA: HCPCS | Performed by: OBSTETRICS & GYNECOLOGY

## 2025-07-31 RX ORDER — PRASTERONE 6.5 MG/1
1 INSERT VAGINAL
Qty: 84 EACH | Refills: 3 | Status: SHIPPED | OUTPATIENT
Start: 2025-07-31

## 2025-08-04 ENCOUNTER — TELEPHONE (OUTPATIENT)
Dept: GENETICS | Facility: CLINIC | Age: 60
End: 2025-08-04

## 2025-08-21 ENCOUNTER — OFFICE VISIT (OUTPATIENT)
Dept: GENETICS | Facility: CLINIC | Age: 60
End: 2025-08-21

## 2025-08-21 DIAGNOSIS — Z17.421 TRIPLE NEGATIVE BREAST CANCER (HCC): ICD-10-CM

## 2025-08-21 DIAGNOSIS — Z80.42 FAMILY HISTORY OF MALIGNANT NEOPLASM OF PROSTATE: ICD-10-CM

## 2025-08-21 DIAGNOSIS — C50.919 TRIPLE NEGATIVE BREAST CANCER (HCC): ICD-10-CM

## 2025-08-21 DIAGNOSIS — Z85.3 HISTORY OF BREAST CANCER: Primary | ICD-10-CM

## 2025-08-21 DIAGNOSIS — Z85.3 ENCOUNTER FOR FOLLOW-UP SURVEILLANCE OF BREAST CANCER: ICD-10-CM

## 2025-08-21 DIAGNOSIS — Z08 ENCOUNTER FOR FOLLOW-UP SURVEILLANCE OF BREAST CANCER: ICD-10-CM

## 2025-08-21 DIAGNOSIS — Z80.3 FAMILY HISTORY OF MALIGNANT NEOPLASM OF BREAST: ICD-10-CM

## 2025-08-21 PROCEDURE — PBNCHG PB NO CHARGE PLACEHOLDER

## (undated) DEVICE — POINTED DRILL BIT

## (undated) DEVICE — OCCLUSIVE GAUZE STRIP,3% BISMUTH TRIBROMOPHENATE IN PETROLATUM BLEND: Brand: XEROFORM

## (undated) DEVICE — SCD SEQUENTIAL COMPRESSION COMFORT SLEEVE MEDIUM KNEE LENGTH: Brand: KENDALL SCD

## (undated) DEVICE — NEEDLE 25G X 1 1/2

## (undated) DEVICE — GLOVE PI ULTRA TOUCH SZ.7.5

## (undated) DEVICE — PLUMEPEN PRO 10FT

## (undated) DEVICE — 25.0 MM X 4.5 MM X 0.60 MM SAGITTAL BLADE

## (undated) DEVICE — STRETCH BANDAGE: Brand: CURITY

## (undated) DEVICE — ASTOUND STANDARD SURGICAL GOWN, XL: Brand: CONVERTORS

## (undated) DEVICE — NEEDLE 18 G X 1 1/2

## (undated) DEVICE — CAST PADDING 4 IN SYNTHETIC NON-STRL

## (undated) DEVICE — Device

## (undated) DEVICE — SYRINGE 10ML LL

## (undated) DEVICE — 10FR FRAZIER SUCTION HANDLE: Brand: CARDINAL HEALTH

## (undated) DEVICE — SUT VICRYL 3-0 PS-2 27 IN J427H

## (undated) DEVICE — GAUZE SPONGES,16 PLY: Brand: CURITY

## (undated) DEVICE — CUFF TOURNIQUET 18 X 4 IN QUICK CONNECT DISP 1 BLADDER

## (undated) DEVICE — GLOVE PI ULTRA TOUCH SZ.7.0

## (undated) DEVICE — 2000CC GUARDIAN II: Brand: GUARDIAN

## (undated) DEVICE — CHLORAPREP HI-LITE 26ML ORANGE

## (undated) DEVICE — INTENDED FOR TISSUE SEPARATION, AND OTHER PROCEDURES THAT REQUIRE A SHARP SURGICAL BLADE TO PUNCTURE OR CUT.: Brand: BARD-PARKER ® CARBON RIB-BACK BLADES

## (undated) DEVICE — GLOVE PI ULTRA TOUCH SZ 6

## (undated) DEVICE — SUT ETHILON 3-0 PS-1 18 IN 1663G

## (undated) DEVICE — TUBING SUCTION 5MM X 12 FT

## (undated) DEVICE — ACE WRAP 4 IN STERILE

## (undated) DEVICE — GLOVE INDICATOR PI UNDERGLOVE SZ 7 BLUE

## (undated) DEVICE — BETHLEHEM UNIVERSAL  MIONR EXT: Brand: CARDINAL HEALTH

## (undated) DEVICE — MASTISOL LIQ ADHESIVE 2/3ML

## (undated) DEVICE — 3M™ STERI-STRIP™ REINFORCED ADHESIVE SKIN CLOSURES, R1546, 1/4 IN X 4 IN (6 MM X 100 MM), 10 STRIPS/ENVELOPE: Brand: 3M™ STERI-STRIP™

## (undated) DEVICE — STOCKINETTE REGULAR